# Patient Record
Sex: FEMALE | Race: ASIAN | NOT HISPANIC OR LATINO | Employment: FULL TIME | URBAN - METROPOLITAN AREA
[De-identification: names, ages, dates, MRNs, and addresses within clinical notes are randomized per-mention and may not be internally consistent; named-entity substitution may affect disease eponyms.]

---

## 2019-04-25 ENCOUNTER — OFFICE VISIT (OUTPATIENT)
Dept: MEDICAL GROUP | Facility: MEDICAL CENTER | Age: 29
End: 2019-04-25
Payer: COMMERCIAL

## 2019-04-25 VITALS
HEIGHT: 60 IN | DIASTOLIC BLOOD PRESSURE: 72 MMHG | TEMPERATURE: 97.9 F | RESPIRATION RATE: 18 BRPM | WEIGHT: 110.01 LBS | SYSTOLIC BLOOD PRESSURE: 110 MMHG | BODY MASS INDEX: 21.6 KG/M2 | OXYGEN SATURATION: 97 % | HEART RATE: 76 BPM

## 2019-04-25 DIAGNOSIS — Z00.00 ENCOUNTER FOR PREVENTATIVE ADULT HEALTH CARE EXAMINATION: ICD-10-CM

## 2019-04-25 DIAGNOSIS — Z00.00 PREVENTATIVE HEALTH CARE: ICD-10-CM

## 2019-04-25 PROCEDURE — 99385 PREV VISIT NEW AGE 18-39: CPT | Performed by: PHYSICIAN ASSISTANT

## 2019-04-25 ASSESSMENT — PATIENT HEALTH QUESTIONNAIRE - PHQ9: CLINICAL INTERPRETATION OF PHQ2 SCORE: 0

## 2019-04-25 NOTE — PROGRESS NOTES
Chief Complaint   Patient presents with   • Establish Care   • Orders Needed       Renee Gonzalez is a 29 y.o. new female here for  Establishing care.    HPI:    Patient is healthy without any questions or concerns.  Currently not taking any medicines or supplements.  She is , periods are regular, q 21 day, not currently taking OCP. Has tried it in the past but didn't like them, because she forgets to take it on time, mood swing and felt restless.  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  Positive second hand smoke exposure for the past 2 years.      Current medicines (including changes today)  No current outpatient prescriptions on file.     No current facility-administered medications for this visit.      She  has no past medical history on file.  She  has no past surgical history on file.  Social History   Substance Use Topics   • Smoking status: Never Smoker   • Smokeless tobacco: Never Used   • Alcohol use No     Social History     Social History Narrative   • No narrative on file     History reviewed. No pertinent family history.  Family Status   Relation Status   • Mo Alive   • Fa Alive   • Sis Alive   • Bro Alive       Past medical, surgical, family, and social history is reviewed in Epic chart by me today.   Medications and allergies reviewed in Epic chart by me today.       ROS  General:  No fevers or chills, no night sweats or fatigue.   Eyes: no change in vision.   ENT:         Ears: No drainage. No change in hearing      Nose :No epitaxis        Mouth/ throat: No lesions. No sore throat  Resp: No difficulty breathing. No cough, wheezing.  CV: no SOB, no palpitation, no chest pain, no edema  GI: no nausea, no vomiting, no diarrhea or constipations. Bowel regular and normal. No melena or hematochezia. No hematemesis  : no Frequency, no urgency, no dysuria, no hematuria.   MS:  Negative for muscle pain or weakness.  Skin: no rashes or abnormal lesions.   Neuro: No seizures, no tingling or  "numbness.   Endo: no polyuria, no polydypsia, no cold intolerance, no heat intolerance  Psych: no depression, no anxiety, no Drug/Alcohol abuse  Hem: no easy bruising.    As documented in history of present illness               Objective:     /72 (Patient Position: Sitting)   Pulse 76   Temp 36.6 °C (97.9 °F) (Temporal)   Resp 18   Ht 1.52 m (4' 11.84\")   Wt 49.9 kg (110 lb 0.2 oz)   SpO2 97%  Body mass index is 21.6 kg/m².  Physical Exam:    Constitutional: Well-developed and well-nourished. No distress.   Skin: Skin is warm and dry. No rashes in visible areas.  Nail: w/o pitting, ridging or onychomycosis   Head: Atraumatic without lesions.  Eyes: Equal, round and reactive, conjunctiva clear,sclera non icteric, lids normal.  Ears:  External ears unremarkable. Tympanic membranes clear and intact.  Nose: Nares patent. Septum midline. Turbinates without erythema nor edema. No discharge.    Mouth/Throat: Dentition is good. Tongue normal. Oropharynx is clear and moist. Posterior pharynx without erythema or exudates.  Neck: Supple, trachea midline.  No thyromegaly present. No lymphadenopathy--cervical or supraclavicular  Cardiovascular: Regular rate and rhythm, without any murmurs.  No lower extremity edema. Cap refill < 3sec  Lung:  Clear to auscultation throughout w/o any wheeze or rhonchi. No adventitious sounds.    Abdomen: Soft, non tender, and without distention.   Musculoskeletal: All major joints without pain or swelling  Neurological: speech normal, mental status intact, gait grossly normal  Psychiatric:   Alert and oriented x3, normal affect and mood and behavior     Assessment and Plan:   The following treatment plan was discussed    1. Encounter for preventative adult health care examination      2. Preventative health care    - CBC WITH DIFFERENTIAL; Future  - Comp Metabolic Panel; Future  - Lipid Profile; Future  - TSH WITH REFLEX TO FT4; Future  - VITAMIN D,25 HYDROXY; Future      Last pap < 3 " yrs ago    Followup: Return if symptoms worsen or fail to improve.         Please note that this dictation was created using voice recognition software. I have made every reasonable attempt to correct obvious errors, but I expect that there are errors of grammar and possibly content that I did not discover before finalizing the note

## 2019-05-04 ENCOUNTER — HOSPITAL ENCOUNTER (OUTPATIENT)
Dept: LAB | Facility: MEDICAL CENTER | Age: 29
End: 2019-05-04
Attending: PHYSICIAN ASSISTANT
Payer: COMMERCIAL

## 2019-05-04 DIAGNOSIS — Z00.00 PREVENTATIVE HEALTH CARE: ICD-10-CM

## 2019-05-04 LAB
25(OH)D3 SERPL-MCNC: 15 NG/ML (ref 30–100)
ALBUMIN SERPL BCP-MCNC: 4.1 G/DL (ref 3.2–4.9)
ALBUMIN/GLOB SERPL: 1.3 G/DL
ALP SERPL-CCNC: 41 U/L (ref 30–99)
ALT SERPL-CCNC: 15 U/L (ref 2–50)
ANION GAP SERPL CALC-SCNC: 4 MMOL/L (ref 0–11.9)
AST SERPL-CCNC: 17 U/L (ref 12–45)
BASOPHILS # BLD AUTO: 1.3 % (ref 0–1.8)
BASOPHILS # BLD: 0.07 K/UL (ref 0–0.12)
BILIRUB SERPL-MCNC: 0.5 MG/DL (ref 0.1–1.5)
BUN SERPL-MCNC: 10 MG/DL (ref 8–22)
CALCIUM SERPL-MCNC: 9.1 MG/DL (ref 8.5–10.5)
CHLORIDE SERPL-SCNC: 105 MMOL/L (ref 96–112)
CHOLEST SERPL-MCNC: 184 MG/DL (ref 100–199)
CO2 SERPL-SCNC: 26 MMOL/L (ref 20–33)
CREAT SERPL-MCNC: 0.69 MG/DL (ref 0.5–1.4)
EOSINOPHIL # BLD AUTO: 0.17 K/UL (ref 0–0.51)
EOSINOPHIL NFR BLD: 3.1 % (ref 0–6.9)
ERYTHROCYTE [DISTWIDTH] IN BLOOD BY AUTOMATED COUNT: 45.1 FL (ref 35.9–50)
FASTING STATUS PATIENT QL REPORTED: NORMAL
GLOBULIN SER CALC-MCNC: 3.1 G/DL (ref 1.9–3.5)
GLUCOSE SERPL-MCNC: 93 MG/DL (ref 65–99)
HCT VFR BLD AUTO: 44.5 % (ref 37–47)
HDLC SERPL-MCNC: 74 MG/DL
HGB BLD-MCNC: 14.2 G/DL (ref 12–16)
IMM GRANULOCYTES # BLD AUTO: 0.01 K/UL (ref 0–0.11)
IMM GRANULOCYTES NFR BLD AUTO: 0.2 % (ref 0–0.9)
LDLC SERPL CALC-MCNC: 96 MG/DL
LYMPHOCYTES # BLD AUTO: 2.31 K/UL (ref 1–4.8)
LYMPHOCYTES NFR BLD: 41.6 % (ref 22–41)
MCH RBC QN AUTO: 30 PG (ref 27–33)
MCHC RBC AUTO-ENTMCNC: 31.9 G/DL (ref 33.6–35)
MCV RBC AUTO: 93.9 FL (ref 81.4–97.8)
MONOCYTES # BLD AUTO: 0.36 K/UL (ref 0–0.85)
MONOCYTES NFR BLD AUTO: 6.5 % (ref 0–13.4)
NEUTROPHILS # BLD AUTO: 2.63 K/UL (ref 2–7.15)
NEUTROPHILS NFR BLD: 47.3 % (ref 44–72)
NRBC # BLD AUTO: 0 K/UL
NRBC BLD-RTO: 0 /100 WBC
PLATELET # BLD AUTO: 339 K/UL (ref 164–446)
PMV BLD AUTO: 9.4 FL (ref 9–12.9)
POTASSIUM SERPL-SCNC: 3.7 MMOL/L (ref 3.6–5.5)
PROT SERPL-MCNC: 7.2 G/DL (ref 6–8.2)
RBC # BLD AUTO: 4.74 M/UL (ref 4.2–5.4)
SODIUM SERPL-SCNC: 135 MMOL/L (ref 135–145)
TRIGL SERPL-MCNC: 72 MG/DL (ref 0–149)
TSH SERPL DL<=0.005 MIU/L-ACNC: 2.76 UIU/ML (ref 0.38–5.33)
WBC # BLD AUTO: 5.6 K/UL (ref 4.8–10.8)

## 2019-05-04 PROCEDURE — 85025 COMPLETE CBC W/AUTO DIFF WBC: CPT

## 2019-05-04 PROCEDURE — 36415 COLL VENOUS BLD VENIPUNCTURE: CPT

## 2019-05-04 PROCEDURE — 84443 ASSAY THYROID STIM HORMONE: CPT

## 2019-05-04 PROCEDURE — 80061 LIPID PANEL: CPT

## 2019-05-04 PROCEDURE — 82306 VITAMIN D 25 HYDROXY: CPT

## 2019-05-04 PROCEDURE — 80053 COMPREHEN METABOLIC PANEL: CPT

## 2019-05-07 ENCOUNTER — TELEPHONE (OUTPATIENT)
Dept: MEDICAL GROUP | Facility: MEDICAL CENTER | Age: 29
End: 2019-05-07

## 2019-05-07 NOTE — TELEPHONE ENCOUNTER
----- Message from Eileen Lockhart P.A.-C. sent at 5/7/2019  3:03 PM PDT -----  Please inform patient that     All labs look stable without any significant abnormal findings.   Vitamin D level is low. You can start over-the-counter vitamin D 1000 international units daily.    Eileen Lockhart P.A.-C.

## 2019-05-15 ENCOUNTER — APPOINTMENT (OUTPATIENT)
Dept: MEDICAL GROUP | Facility: MEDICAL CENTER | Age: 29
End: 2019-05-15
Payer: COMMERCIAL

## 2019-10-23 ENCOUNTER — HOSPITAL ENCOUNTER (OUTPATIENT)
Facility: MEDICAL CENTER | Age: 29
End: 2019-10-23
Attending: PHYSICIAN ASSISTANT
Payer: COMMERCIAL

## 2019-10-23 ENCOUNTER — OFFICE VISIT (OUTPATIENT)
Dept: MEDICAL GROUP | Facility: MEDICAL CENTER | Age: 29
End: 2019-10-23
Payer: COMMERCIAL

## 2019-10-23 VITALS
WEIGHT: 117 LBS | TEMPERATURE: 97.9 F | HEART RATE: 84 BPM | RESPIRATION RATE: 18 BRPM | HEIGHT: 60 IN | OXYGEN SATURATION: 98 % | BODY MASS INDEX: 22.97 KG/M2 | DIASTOLIC BLOOD PRESSURE: 78 MMHG | SYSTOLIC BLOOD PRESSURE: 110 MMHG

## 2019-10-23 DIAGNOSIS — Z01.419 ROUTINE GYNECOLOGICAL EXAMINATION: ICD-10-CM

## 2019-10-23 DIAGNOSIS — Z23 NEED FOR VACCINATION: ICD-10-CM

## 2019-10-23 DIAGNOSIS — Z12.4 SCREENING FOR MALIGNANT NEOPLASM OF CERVIX: ICD-10-CM

## 2019-10-23 PROCEDURE — 87624 HPV HI-RISK TYP POOLED RSLT: CPT

## 2019-10-23 PROCEDURE — 90686 IIV4 VACC NO PRSV 0.5 ML IM: CPT | Performed by: PHYSICIAN ASSISTANT

## 2019-10-23 PROCEDURE — 88175 CYTOPATH C/V AUTO FLUID REDO: CPT

## 2019-10-23 PROCEDURE — 90471 IMMUNIZATION ADMIN: CPT | Performed by: PHYSICIAN ASSISTANT

## 2019-10-23 PROCEDURE — 99395 PREV VISIT EST AGE 18-39: CPT | Mod: 25 | Performed by: PHYSICIAN ASSISTANT

## 2019-10-23 NOTE — PROGRESS NOTES
SUBJECTIVE:   Chief Complaint   Patient presents with   • Gynecologic Exam     pap       29 y.o. female for annual routine gynecologic exam. Generally the patient is feeling good. She has no complaints or concerns.   Regarding her health maintenance:       OB History    Para Term  AB Living   0 0 0 0 0 0   SAB TAB Ectopic Molar Multiple Live Births   0 0 0 0 0 0      Social History     Substance and Sexual Activity   Sexual Activity Yes   • Partners: Male       Last Pap: years ago in [facility]   H/O Abnormal Pap no  H/O STD: no  Menses every month with 4-5 days moderate bleeding.  No significant bloating/fluid retention, pelvic pain, or dyspareunia. No vaginal discharge  She does perform regular self breast exams.  No breast tenderness, mass, nipple discharge, changes in size or contour, or abnormal cyclic discomfort.          ROS:    NO SOB, CP, Palpitations, hest pain on exertion.  No urinary tract symptoms, no incontinence, hematuria.   No dyspareunia, no vaginal discharge   No abdominal pain, change in bowel habits, black or bloody stools.    No unusual headaches, no visual changes, menstrual migraines   No depression, or anxiety, libido changes,   No rash,  pigment changes.       Social History     Tobacco Use   • Smoking status: Never Smoker   • Smokeless tobacco: Never Used   Substance Use Topics   • Alcohol use: No   • Drug use: No       There are no active problems to display for this patient.      History reviewed. No pertinent past medical history.  History reviewed. No pertinent surgical history.      History reviewed. No pertinent family history.  Family Status   Relation Name Status   • Mo  Alive   • Fa  Alive   • Sis  Alive   • Bro  Alive         Current medicines (including changes today)  No current outpatient medications on file.     No current facility-administered medications for this visit.            Allergies: Patient has no allergy information on record.     Preventive  "Care:  Health Maintenance Topics with due status: Overdue       Topic Date Due    PAP SMEAR 03/29/2010    IMM INFLUENZA 09/01/2019       OBJECTIVE:   /78 (BP Location: Right arm, Patient Position: Sitting, BP Cuff Size: Adult)   Pulse 84   Temp 36.6 °C (97.9 °F) (Temporal)   Resp 18   Ht 1.52 m (4' 11.84\")   Wt 53.1 kg (117 lb)   LMP 09/29/2019   SpO2 98%   Breastfeeding? No   BMI 22.97 kg/m²   Body mass index is 22.97 kg/m².      Gen: Well developed, well nourished in no acute distress.   Skin: Pink, warm, and dry  Abdomen: soft, nontender,  No organomegaly,  Ext: no edema, color normal, vascularity normal, temperature normal  Alert and oriented Eye contact is good, speech goal directed, affect calm     Breast Exam: Performed with instruction during examination. No axillary lymphadenopathy, no skin changes, no dominant masses. No nipple retraction, no nipple discharge    Pelvic Exam:  Normal external genitalia with no lesions. Normal vaginal mucosa with normal rugation and no discharge. Cervix with no visible lesions. No cervical motion tenderness. Uterus is normal sized with no masses. No adnexal tenderness or enlargement appreciated. Pap is obtained and the specimen was sent to lab,        <ASSESSMENT and PLAN>  1. Need for vaccination  Influenza Vaccine Quad Injection (PF)   2. Screening for malignant neoplasm of cervix  THINPREP PAP WITH HPV   3. Routine gynecological examination             Follow-up prn                    "

## 2019-10-24 DIAGNOSIS — Z12.4 SCREENING FOR MALIGNANT NEOPLASM OF CERVIX: ICD-10-CM

## 2019-10-24 LAB
CYTOLOGY REG CYTOL: NORMAL
HPV HR 12 DNA CVX QL NAA+PROBE: NEGATIVE
HPV16 DNA SPEC QL NAA+PROBE: NEGATIVE
HPV18 DNA SPEC QL NAA+PROBE: NEGATIVE
SPECIMEN SOURCE: NORMAL

## 2019-10-24 PROCEDURE — 88175 CYTOPATH C/V AUTO FLUID REDO: CPT

## 2019-10-29 ENCOUNTER — TELEPHONE (OUTPATIENT)
Dept: MEDICAL GROUP | Facility: MEDICAL CENTER | Age: 29
End: 2019-10-29

## 2019-10-29 NOTE — TELEPHONE ENCOUNTER
----- Message from Eileen Lockhart P.A.-C. sent at 10/29/2019 12:06 PM PDT -----  Jamil Duran,    The results of your most recent Pap smear are normal with neg HPV which is a virus associated with cervical cancer.    Thank you,  Eileen Lockhart P.A.-C.

## 2019-10-29 NOTE — TELEPHONE ENCOUNTER
Phone Number Called: [phone] (home)     Call outcome: left message for patient to call back regarding message below    Message: LVM informing pt. We have her results and to give us a call back.

## 2020-01-23 ENCOUNTER — OFFICE VISIT (OUTPATIENT)
Dept: MEDICAL GROUP | Facility: MEDICAL CENTER | Age: 30
End: 2020-01-23
Payer: COMMERCIAL

## 2020-01-23 VITALS
DIASTOLIC BLOOD PRESSURE: 62 MMHG | HEIGHT: 60 IN | HEART RATE: 92 BPM | BODY MASS INDEX: 22.26 KG/M2 | OXYGEN SATURATION: 96 % | WEIGHT: 113.4 LBS | TEMPERATURE: 98.8 F | RESPIRATION RATE: 16 BRPM | SYSTOLIC BLOOD PRESSURE: 100 MMHG

## 2020-01-23 DIAGNOSIS — J02.9 SORE THROAT: ICD-10-CM

## 2020-01-23 DIAGNOSIS — J02.0 STREP THROAT: ICD-10-CM

## 2020-01-23 LAB
INT CON NEG: NEGATIVE
INT CON POS: POSITIVE
S PYO AG THROAT QL: POSITIVE

## 2020-01-23 PROCEDURE — 87880 STREP A ASSAY W/OPTIC: CPT | Performed by: PHYSICIAN ASSISTANT

## 2020-01-23 PROCEDURE — 99214 OFFICE O/P EST MOD 30 MIN: CPT | Performed by: PHYSICIAN ASSISTANT

## 2020-01-23 RX ORDER — AMOXICILLIN 500 MG/1
500 CAPSULE ORAL 3 TIMES DAILY
Qty: 30 CAP | Refills: 0 | Status: SHIPPED | OUTPATIENT
Start: 2020-01-23 | End: 2020-02-02

## 2020-01-23 ASSESSMENT — PATIENT HEALTH QUESTIONNAIRE - PHQ9: CLINICAL INTERPRETATION OF PHQ2 SCORE: 0

## 2020-01-23 NOTE — PROGRESS NOTES
"Subjective:      Renee Gonzalez is a 29 y.o. female who presents with Pharyngitis (x 2 days)        sore throat X 2 days    HPIPatient presents with sore throat, fatigue, body aches started 2 days ago. No recent travel. People at work are sick. No cough. No congestion. otc med not helping. Getting worse. No difficulty swallowing or speaking. No neck pain or stiffness.    ROSno known fever. Some chills and body aches. No headache/dizziness. No chest pain, SOB, difficulty breathing. No cough/chest congestion. No n/v/d/c or abdominal pain. No rash or skin lesion. No joint redness or swelling. No urinary symptoms.    PMHX: negative for heart or lung disease. Negative for diabetes or immune disorder  Meds: on no regular daily meds  nkda  Non-smoker, works in [employer]   Objective:     /62 (BP Location: Left arm, Patient Position: Sitting, BP Cuff Size: Adult long)   Pulse 92   Temp 37.1 °C (98.8 °F) (Temporal)   Resp 16   Ht 1.52 m (4' 11.84\")   Wt 51.4 kg (113 lb 6.4 oz)   SpO2 96%   BMI 22.27 kg/m²      Physical Exam  Vitals signs and nursing note reviewed.   Constitutional:       General: She is not in acute distress.     Appearance: Normal appearance. She is normal weight. She is not ill-appearing, toxic-appearing or diaphoretic.   HENT:      Head: Normocephalic and atraumatic.      Right Ear: Tympanic membrane, ear canal and external ear normal.      Left Ear: Tympanic membrane, ear canal and external ear normal.      Nose: Nose normal.      Mouth/Throat:      Mouth: Mucous membranes are moist.      Pharynx: Oropharyngeal exudate and posterior oropharyngeal erythema present.      Comments: bilat enlarged 2+ tonsils  Eyes:      Conjunctiva/sclera: Conjunctivae normal.   Neck:      Musculoskeletal: Normal range of motion and neck supple. No neck rigidity or muscular tenderness.   Cardiovascular:      Rate and Rhythm: Normal rate and regular rhythm.   Pulmonary:      Effort: Pulmonary effort is normal.      " Breath sounds: Normal breath sounds.   Lymphadenopathy:      Cervical: No cervical adenopathy.   Skin:     General: Skin is warm and dry.      Coloration: Skin is not pale.      Findings: No rash.   Neurological:      General: No focal deficit present.      Mental Status: She is alert and oriented to person, place, and time.   Psychiatric:         Mood and Affect: Mood normal.         Behavior: Behavior normal.         Thought Content: Thought content normal.         Judgment: Judgment normal.               POCT strep positive  Assessment/Plan:     1. Strep throat    - amoxicillin (AMOXIL) 500 MG Cap; Take 1 Cap by mouth 3 times a day for 10 days.  Dispense: 30 Cap; Refill: 0    2. Sore throat - supportive care and otc meds discussed. Stay home from work today. Ok to return tomorrow if no fever.     - POCT Rapid Strep A  - amoxicillin (AMOXIL) 500 MG Cap; Take 1 Cap by mouth 3 times a day for 10 days.  Dispense: 30 Cap; Refill: 0    F/u prn

## 2020-11-24 ENCOUNTER — NON-PROVIDER VISIT (OUTPATIENT)
Dept: MEDICAL GROUP | Facility: MEDICAL CENTER | Age: 30
End: 2020-11-24
Payer: COMMERCIAL

## 2020-11-24 DIAGNOSIS — Z23 NEED FOR VACCINATION: ICD-10-CM

## 2020-11-24 PROCEDURE — 99999 PR NO CHARGE: CPT | Performed by: PHYSICIAN ASSISTANT

## 2020-11-24 PROCEDURE — 90471 IMMUNIZATION ADMIN: CPT | Performed by: PHYSICIAN ASSISTANT

## 2020-11-24 PROCEDURE — 90686 IIV4 VACC NO PRSV 0.5 ML IM: CPT | Performed by: PHYSICIAN ASSISTANT

## 2020-11-24 NOTE — PROGRESS NOTES
"Renee Gonzalez is a 30 y.o. female here for a non-provider visit for:   FLU    Reason for immunization: Annual Flu Vaccine  Immunization records indicate need for vaccine: Yes, confirmed with Epic  Minimum interval has been met for this vaccine: Yes  ABN completed: Not Indicated    Order and dose verified by: JOHNNY  VIS Dated  08/19/2019 was given to patient: Yes  All IAC Questionnaire questions were answered \"No.\"    Patient tolerated injection and no adverse effects were observed or reported: Yes    Pt scheduled for next dose in series: Not Indicated    "

## 2021-01-12 DIAGNOSIS — Z00.00 PREVENTATIVE HEALTH CARE: ICD-10-CM

## 2021-01-13 ENCOUNTER — HOSPITAL ENCOUNTER (OUTPATIENT)
Dept: LAB | Facility: MEDICAL CENTER | Age: 31
End: 2021-01-13
Attending: PHYSICIAN ASSISTANT
Payer: COMMERCIAL

## 2021-01-13 DIAGNOSIS — Z00.00 PREVENTATIVE HEALTH CARE: ICD-10-CM

## 2021-01-13 LAB
25(OH)D3 SERPL-MCNC: 18 NG/ML (ref 30–100)
ALBUMIN SERPL BCP-MCNC: 4.6 G/DL (ref 3.2–4.9)
ALBUMIN/GLOB SERPL: 1.4 G/DL
ALP SERPL-CCNC: 54 U/L (ref 30–99)
ALT SERPL-CCNC: 9 U/L (ref 2–50)
ANION GAP SERPL CALC-SCNC: 9 MMOL/L (ref 7–16)
AST SERPL-CCNC: 17 U/L (ref 12–45)
BASOPHILS # BLD AUTO: 1.2 % (ref 0–1.8)
BASOPHILS # BLD: 0.07 K/UL (ref 0–0.12)
BILIRUB SERPL-MCNC: 0.2 MG/DL (ref 0.1–1.5)
BUN SERPL-MCNC: 16 MG/DL (ref 8–22)
CALCIUM SERPL-MCNC: 9.1 MG/DL (ref 8.5–10.5)
CHLORIDE SERPL-SCNC: 104 MMOL/L (ref 96–112)
CHOLEST SERPL-MCNC: 186 MG/DL (ref 100–199)
CO2 SERPL-SCNC: 23 MMOL/L (ref 20–33)
CREAT SERPL-MCNC: 0.65 MG/DL (ref 0.5–1.4)
EOSINOPHIL # BLD AUTO: 0.07 K/UL (ref 0–0.51)
EOSINOPHIL NFR BLD: 1.2 % (ref 0–6.9)
ERYTHROCYTE [DISTWIDTH] IN BLOOD BY AUTOMATED COUNT: 44.1 FL (ref 35.9–50)
GLOBULIN SER CALC-MCNC: 3.2 G/DL (ref 1.9–3.5)
GLUCOSE SERPL-MCNC: 98 MG/DL (ref 65–99)
HCT VFR BLD AUTO: 43 % (ref 37–47)
HDLC SERPL-MCNC: 66 MG/DL
HGB BLD-MCNC: 14 G/DL (ref 12–16)
IMM GRANULOCYTES # BLD AUTO: 0.01 K/UL (ref 0–0.11)
IMM GRANULOCYTES NFR BLD AUTO: 0.2 % (ref 0–0.9)
LDLC SERPL CALC-MCNC: 107 MG/DL
LYMPHOCYTES # BLD AUTO: 2.11 K/UL (ref 1–4.8)
LYMPHOCYTES NFR BLD: 37.1 % (ref 22–41)
MCH RBC QN AUTO: 29.9 PG (ref 27–33)
MCHC RBC AUTO-ENTMCNC: 32.6 G/DL (ref 33.6–35)
MCV RBC AUTO: 91.9 FL (ref 81.4–97.8)
MONOCYTES # BLD AUTO: 0.31 K/UL (ref 0–0.85)
MONOCYTES NFR BLD AUTO: 5.5 % (ref 0–13.4)
NEUTROPHILS # BLD AUTO: 3.11 K/UL (ref 2–7.15)
NEUTROPHILS NFR BLD: 54.8 % (ref 44–72)
NRBC # BLD AUTO: 0 K/UL
NRBC BLD-RTO: 0 /100 WBC
PLATELET # BLD AUTO: 368 K/UL (ref 164–446)
PMV BLD AUTO: 9.1 FL (ref 9–12.9)
POTASSIUM SERPL-SCNC: 4.3 MMOL/L (ref 3.6–5.5)
PROT SERPL-MCNC: 7.8 G/DL (ref 6–8.2)
RBC # BLD AUTO: 4.68 M/UL (ref 4.2–5.4)
SODIUM SERPL-SCNC: 136 MMOL/L (ref 135–145)
TRIGL SERPL-MCNC: 65 MG/DL (ref 0–149)
TSH SERPL DL<=0.005 MIU/L-ACNC: 3.01 UIU/ML (ref 0.38–5.33)
WBC # BLD AUTO: 5.7 K/UL (ref 4.8–10.8)

## 2021-01-13 PROCEDURE — 80053 COMPREHEN METABOLIC PANEL: CPT

## 2021-01-13 PROCEDURE — 82306 VITAMIN D 25 HYDROXY: CPT

## 2021-01-13 PROCEDURE — 36415 COLL VENOUS BLD VENIPUNCTURE: CPT

## 2021-01-13 PROCEDURE — 80061 LIPID PANEL: CPT

## 2021-01-13 PROCEDURE — 84443 ASSAY THYROID STIM HORMONE: CPT

## 2021-01-13 PROCEDURE — 85025 COMPLETE CBC W/AUTO DIFF WBC: CPT

## 2021-01-14 ENCOUNTER — TELEPHONE (OUTPATIENT)
Dept: MEDICAL GROUP | Facility: MEDICAL CENTER | Age: 31
End: 2021-01-14

## 2021-01-14 NOTE — TELEPHONE ENCOUNTER
Phone Number Called: [phone] (home)     Call outcome: Did not leave a detailed message. Requested patient to call back.    Message: LVM for pt to give us a call back regarding her lab results.

## 2021-01-15 ENCOUNTER — OFFICE VISIT (OUTPATIENT)
Dept: MEDICAL GROUP | Facility: MEDICAL CENTER | Age: 31
End: 2021-01-15
Payer: COMMERCIAL

## 2021-01-15 VITALS
HEIGHT: 60 IN | OXYGEN SATURATION: 97 % | SYSTOLIC BLOOD PRESSURE: 112 MMHG | BODY MASS INDEX: 22.58 KG/M2 | WEIGHT: 115 LBS | RESPIRATION RATE: 12 BRPM | DIASTOLIC BLOOD PRESSURE: 74 MMHG | TEMPERATURE: 98.9 F | HEART RATE: 80 BPM

## 2021-01-15 DIAGNOSIS — Z00.00 ANNUAL PHYSICAL EXAM: ICD-10-CM

## 2021-01-15 PROCEDURE — 99395 PREV VISIT EST AGE 18-39: CPT | Performed by: PHYSICIAN ASSISTANT

## 2021-01-15 ASSESSMENT — PATIENT HEALTH QUESTIONNAIRE - PHQ9: CLINICAL INTERPRETATION OF PHQ2 SCORE: 0

## 2021-01-15 ASSESSMENT — FIBROSIS 4 INDEX: FIB4 SCORE: 0.48

## 2021-01-15 NOTE — PROGRESS NOTES
Subjective:   CC:   Chief Complaint   Patient presents with   • Annual Exam       Renee Gonzalez is a 30 y.o. female here today for annual exam.  Patient is feeling good without any questions concerns or complaints.  Weight has been stable.  All labs were reviewed by me and discussed with patient.  Vitamin D level is low and patient is currently only taking a multivitamin  Last Pap 1 and half years ago, normal        Current medicines (including changes today)  No current outpatient medications on file.     No current facility-administered medications for this visit.          Past medical, surgical, family, and social history are reviewed in Epic chart by me today.   Medications and allergies reviewed in Epic chart by me today.         ROS   No chest pain, no shortness of breath, no abdominal pain  As documented in history of present illness above     Objective:     /74 (BP Location: Right arm, Patient Position: Sitting, BP Cuff Size: Adult)   Pulse 80   Temp 37.2 °C (98.9 °F) (Temporal)   Resp 12   Ht 1.524 m (5')   Wt 52.2 kg (115 lb)   SpO2 97%  Body mass index is 22.46 kg/m².   Physical Exam:  Constitutional: Alert, oriented in no acute distress.  Psych: Eye contact is good, speech goal directed, affect calm  Eyes: Conjunctiva non-injected, sclera non-icteric.  ENMT: Ears:Pinna normal. TM pearly gray.              Neck: No cervical or supraclavicular lymphadenopathy,Trachea midline, no thyromegaly, no masses  Lungs: Unlabored respiratory effort, clear to auscultation bilaterally with good excursion, no wheez or rhonci  CV: regular rate and rhythm. No lower extremity edema  Abdomen: soft, nontender, No CVAT  Skin: no lesions in visible areas.  Ext: no edema, color normal, vascularity normal, temperature normal        Assessment and Plan:   The following treatment plan was discussed    1. Annual physical exam        Followup: prn          Please note that this dictation was created using voice  recognition software. I have made every reasonable attempt to correct obvious errors, but I expect that there are errors of grammar and possibly content that I did not discover before finalizing the note.

## 2021-04-27 ENCOUNTER — IMMUNIZATION (OUTPATIENT)
Dept: FAMILY PLANNING/WOMEN'S HEALTH CLINIC | Facility: IMMUNIZATION CENTER | Age: 31
End: 2021-04-27
Payer: COMMERCIAL

## 2021-04-27 DIAGNOSIS — Z23 ENCOUNTER FOR VACCINATION: Primary | ICD-10-CM

## 2021-04-27 PROCEDURE — 0001A PFIZER SARS-COV-2 VACCINE: CPT

## 2021-04-27 PROCEDURE — 91300 PFIZER SARS-COV-2 VACCINE: CPT

## 2021-05-01 ENCOUNTER — PATIENT OUTREACH (OUTPATIENT)
Dept: SCHEDULING | Facility: IMAGING CENTER | Age: 31
End: 2021-05-01

## 2021-05-20 ENCOUNTER — IMMUNIZATION (OUTPATIENT)
Dept: FAMILY PLANNING/WOMEN'S HEALTH CLINIC | Facility: IMMUNIZATION CENTER | Age: 31
End: 2021-05-20
Payer: COMMERCIAL

## 2021-05-20 DIAGNOSIS — Z23 ENCOUNTER FOR VACCINATION: Primary | ICD-10-CM

## 2021-05-20 PROCEDURE — 0002A PFIZER SARS-COV-2 VACCINE: CPT

## 2021-05-20 PROCEDURE — 91300 PFIZER SARS-COV-2 VACCINE: CPT

## 2021-09-21 ENCOUNTER — OFFICE VISIT (OUTPATIENT)
Dept: MEDICAL GROUP | Facility: MEDICAL CENTER | Age: 31
End: 2021-09-21
Payer: COMMERCIAL

## 2021-09-21 VITALS
SYSTOLIC BLOOD PRESSURE: 108 MMHG | TEMPERATURE: 98.1 F | HEART RATE: 76 BPM | WEIGHT: 108.91 LBS | DIASTOLIC BLOOD PRESSURE: 78 MMHG | HEIGHT: 60 IN | RESPIRATION RATE: 16 BRPM | OXYGEN SATURATION: 98 % | BODY MASS INDEX: 21.38 KG/M2

## 2021-09-21 DIAGNOSIS — N94.6 MENSTRUAL PAIN: ICD-10-CM

## 2021-09-21 PROCEDURE — 99213 OFFICE O/P EST LOW 20 MIN: CPT | Performed by: PHYSICIAN ASSISTANT

## 2021-09-21 ASSESSMENT — FIBROSIS 4 INDEX: FIB4 SCORE: 0.49

## 2021-09-21 NOTE — PROGRESS NOTES
Chief Complaint   Patient presents with   • Abdominal Pain     during menstrual        HPI  Renee Gonzalez is a 31 y.o. female here today for abdominal pain with her menses.     HPI:  Patient states she usually gets painful periods however her last menses was excruciating and very painful which concerned her.  Pain was over lower abdomen LMP Aug 30- Sep 2nd, flow without changes, pain has resolved after her period was over.  Patient is currently not taking any birth control, wants to start a family in the next year or 2        Exam:  /78 (BP Location: Left arm, Patient Position: Sitting)   Pulse 76   Temp 36.7 °C (98.1 °F) (Temporal)   Resp 16   Ht 1.524 m (5')   Wt 49.4 kg (108 lb 14.5 oz)   SpO2 98%       Constitutional: Alert, oriented in no acute distress.  Psych: Eye contact is good, speech goal directed, affect calm  Abdomen: soft, nontender, No CVAT    A/P:  1. Menstrual pain  Advised patient to follow-up if this happens again however at this point not very concerning as the pain was only during her menses  - US-PELVIC COMPLETE (TRANSABDOMINAL/TRANSVAGINAL) (COMBO); Future        F/U: prn    Kidney transplant recipient

## 2021-10-04 ENCOUNTER — HOSPITAL ENCOUNTER (OUTPATIENT)
Dept: RADIOLOGY | Facility: MEDICAL CENTER | Age: 31
End: 2021-10-04
Attending: PHYSICIAN ASSISTANT
Payer: COMMERCIAL

## 2021-10-04 DIAGNOSIS — N94.6 MENSTRUAL PAIN: ICD-10-CM

## 2021-10-04 PROCEDURE — 76830 TRANSVAGINAL US NON-OB: CPT

## 2021-10-06 ENCOUNTER — TELEPHONE (OUTPATIENT)
Dept: MEDICAL GROUP | Facility: MEDICAL CENTER | Age: 31
End: 2021-10-06

## 2021-10-06 DIAGNOSIS — R10.2 PELVIC PAIN IN FEMALE: ICD-10-CM

## 2021-10-06 DIAGNOSIS — N80.9 ENDOMETRIOSIS: ICD-10-CM

## 2021-10-06 NOTE — TELEPHONE ENCOUNTER
[facility] for pt to call back for results        ----- Message from Latonia Parada M.D. sent at 10/6/2021  4:19 PM PDT -----  Her US shows possible endometrosis which is a common cause of menstrual pain. The radiologist recommended a follow up MRI for more information.  I would like her to do this and schedule follow up with Eileen or gyn to discuss

## 2021-10-07 ENCOUNTER — TELEPHONE (OUTPATIENT)
Dept: MEDICAL GROUP | Facility: MEDICAL CENTER | Age: 31
End: 2021-10-07

## 2021-10-07 NOTE — TELEPHONE ENCOUNTER
Phone Number Called: [phone] (home)     Call outcome: Did not leave a detailed message. Requested patient to call back.    Message: LVM for patient regarding U/S results, no answer from pt

## 2021-10-26 ENCOUNTER — APPOINTMENT (OUTPATIENT)
Dept: RADIOLOGY | Facility: MEDICAL CENTER | Age: 31
End: 2021-10-26
Attending: FAMILY MEDICINE
Payer: COMMERCIAL

## 2021-10-26 DIAGNOSIS — R10.2 PELVIC PAIN IN FEMALE: ICD-10-CM

## 2021-10-26 PROCEDURE — 72197 MRI PELVIS W/O & W/DYE: CPT

## 2021-10-26 PROCEDURE — 700117 HCHG RX CONTRAST REV CODE 255: Performed by: FAMILY MEDICINE

## 2021-10-26 PROCEDURE — A9576 INJ PROHANCE MULTIPACK: HCPCS | Performed by: FAMILY MEDICINE

## 2021-10-26 RX ADMIN — GADOTERIDOL 15 ML: 279.3 INJECTION, SOLUTION INTRAVENOUS at 14:25

## 2021-12-08 ENCOUNTER — OFFICE VISIT (OUTPATIENT)
Dept: MEDICAL GROUP | Facility: MEDICAL CENTER | Age: 31
End: 2021-12-08
Payer: COMMERCIAL

## 2021-12-08 VITALS
HEART RATE: 92 BPM | RESPIRATION RATE: 16 BRPM | DIASTOLIC BLOOD PRESSURE: 70 MMHG | TEMPERATURE: 97.7 F | OXYGEN SATURATION: 97 % | BODY MASS INDEX: 21.9 KG/M2 | SYSTOLIC BLOOD PRESSURE: 110 MMHG | WEIGHT: 111.55 LBS | HEIGHT: 60 IN

## 2021-12-08 DIAGNOSIS — N80.9 ENDOMETRIOSIS: ICD-10-CM

## 2021-12-08 DIAGNOSIS — Z23 NEED FOR VACCINATION: ICD-10-CM

## 2021-12-08 PROCEDURE — 90471 IMMUNIZATION ADMIN: CPT | Performed by: PHYSICIAN ASSISTANT

## 2021-12-08 PROCEDURE — 90686 IIV4 VACC NO PRSV 0.5 ML IM: CPT | Performed by: PHYSICIAN ASSISTANT

## 2021-12-08 PROCEDURE — 99213 OFFICE O/P EST LOW 20 MIN: CPT | Mod: 25 | Performed by: PHYSICIAN ASSISTANT

## 2021-12-08 RX ORDER — NORETHINDRONE ACETATE AND ETHINYL ESTRADIOL, ETHINYL ESTRADIOL AND FERROUS FUMARATE 1MG-10(24)
1 KIT ORAL DAILY
Qty: 84 TABLET | Refills: 3 | Status: SHIPPED | OUTPATIENT
Start: 2021-12-08

## 2021-12-08 ASSESSMENT — FIBROSIS 4 INDEX: FIB4 SCORE: 0.49

## 2021-12-08 NOTE — PROGRESS NOTES
Chief Complaint   Patient presents with   • Referral Update Request     obgyn        ISHMAEL Duran ROMAN Gonzalez is a 31 y.o. female here today with  for follow-up on cramps and endometriosis.      Transvaginal ultrasound and MRI suggested patient have endometriosis, states she gets pain and cramping with. But also sometimes not on her menses. Patient is not on any B birth control and is hesitant to start OCP since she has tried it once many years ago any affected her mood.  Patient thinks they might want to start a family in next couple years.          Exam:  /70 (BP Location: Left arm, Patient Position: Sitting)   Pulse 92   Temp 36.5 °C (97.7 °F) (Temporal)   Resp 16   Ht 1.524 m (5')   Wt 50.6 kg (111 lb 8.8 oz)   SpO2 97%       Constitutional: Alert, oriented in no acute distress.  Psych: Eye contact is good, speech goal directed, affect calm  Eyes: Conjunctiva non-injected, sclera non-icteric.  Lungs: Unlabored respiratory effort, clear to auscultation bilaterally with good excursion, no wheez or rhonci  CV: regular rate and rhythm. No lower extremity edema        A/P:  1. Need for vaccination    - INFLUENZA VACCINE QUAD INJ (PF)    2. Endometriosis  Patient is hesitant to start OCP, stated she might try it  - Referral to OB/Gyn  - Norethin-Eth Estrad-Fe Biphas (LO LOESTRIN FE) 1 MG-10 MCG / 10 MCG Tab; Take 1 Tablet by mouth every day.  Dispense: 84 Tablet; Refill: 3        F/U: prn

## 2022-06-02 ENCOUNTER — HOSPITAL ENCOUNTER (OUTPATIENT)
Facility: MEDICAL CENTER | Age: 32
End: 2022-06-02
Attending: OBSTETRICS & GYNECOLOGY
Payer: COMMERCIAL

## 2022-06-02 PROCEDURE — 88175 CYTOPATH C/V AUTO FLUID REDO: CPT

## 2022-06-02 PROCEDURE — 87624 HPV HI-RISK TYP POOLED RSLT: CPT

## 2022-07-06 ENCOUNTER — HOSPITAL ENCOUNTER (OUTPATIENT)
Dept: RADIOLOGY | Facility: MEDICAL CENTER | Age: 32
End: 2022-07-06
Attending: OBSTETRICS & GYNECOLOGY
Payer: COMMERCIAL

## 2022-07-06 DIAGNOSIS — N80.109 ENDOMETRIOSIS OF OVARY: ICD-10-CM

## 2022-07-06 PROCEDURE — 76830 TRANSVAGINAL US NON-OB: CPT

## 2022-11-01 ENCOUNTER — TELEPHONE (OUTPATIENT)
Dept: MEDICAL GROUP | Facility: MEDICAL CENTER | Age: 32
End: 2022-11-01
Payer: COMMERCIAL

## 2022-11-01 DIAGNOSIS — Z00.00 PREVENTATIVE HEALTH CARE: ICD-10-CM

## 2022-11-01 NOTE — TELEPHONE ENCOUNTER
PT called stating she has upcoming apt with you on 11/10. Would you like to order labs prior to her apt or during? Please advise.

## 2022-11-02 ENCOUNTER — HOSPITAL ENCOUNTER (OUTPATIENT)
Dept: LAB | Facility: MEDICAL CENTER | Age: 32
End: 2022-11-02
Attending: PHYSICIAN ASSISTANT
Payer: COMMERCIAL

## 2022-11-02 DIAGNOSIS — Z00.00 PREVENTATIVE HEALTH CARE: ICD-10-CM

## 2022-11-02 LAB
ALBUMIN SERPL BCP-MCNC: 4.6 G/DL (ref 3.2–4.9)
ALBUMIN/GLOB SERPL: 1.4 G/DL
ALP SERPL-CCNC: 44 U/L (ref 30–99)
ALT SERPL-CCNC: 8 U/L (ref 2–50)
ANION GAP SERPL CALC-SCNC: 12 MMOL/L (ref 7–16)
AST SERPL-CCNC: 14 U/L (ref 12–45)
BASOPHILS # BLD AUTO: 1.7 % (ref 0–1.8)
BASOPHILS # BLD: 0.08 K/UL (ref 0–0.12)
BILIRUB SERPL-MCNC: 0.3 MG/DL (ref 0.1–1.5)
BUN SERPL-MCNC: 11 MG/DL (ref 8–22)
CALCIUM SERPL-MCNC: 9.3 MG/DL (ref 8.5–10.5)
CHLORIDE SERPL-SCNC: 105 MMOL/L (ref 96–112)
CHOLEST SERPL-MCNC: 191 MG/DL (ref 100–199)
CO2 SERPL-SCNC: 22 MMOL/L (ref 20–33)
CREAT SERPL-MCNC: 0.77 MG/DL (ref 0.5–1.4)
EOSINOPHIL # BLD AUTO: 0.08 K/UL (ref 0–0.51)
EOSINOPHIL NFR BLD: 1.7 % (ref 0–6.9)
ERYTHROCYTE [DISTWIDTH] IN BLOOD BY AUTOMATED COUNT: 45.8 FL (ref 35.9–50)
FASTING STATUS PATIENT QL REPORTED: NORMAL
GFR SERPLBLD CREATININE-BSD FMLA CKD-EPI: 104 ML/MIN/1.73 M 2
GLOBULIN SER CALC-MCNC: 3.3 G/DL (ref 1.9–3.5)
GLUCOSE SERPL-MCNC: 105 MG/DL (ref 65–99)
HCT VFR BLD AUTO: 44.2 % (ref 37–47)
HDLC SERPL-MCNC: 73 MG/DL
HGB BLD-MCNC: 14.7 G/DL (ref 12–16)
IMM GRANULOCYTES # BLD AUTO: 0.01 K/UL (ref 0–0.11)
IMM GRANULOCYTES NFR BLD AUTO: 0.2 % (ref 0–0.9)
LDLC SERPL CALC-MCNC: 104 MG/DL
LYMPHOCYTES # BLD AUTO: 1.91 K/UL (ref 1–4.8)
LYMPHOCYTES NFR BLD: 40 % (ref 22–41)
MCH RBC QN AUTO: 30.8 PG (ref 27–33)
MCHC RBC AUTO-ENTMCNC: 33.3 G/DL (ref 33.6–35)
MCV RBC AUTO: 92.5 FL (ref 81.4–97.8)
MONOCYTES # BLD AUTO: 0.3 K/UL (ref 0–0.85)
MONOCYTES NFR BLD AUTO: 6.3 % (ref 0–13.4)
NEUTROPHILS # BLD AUTO: 2.39 K/UL (ref 2–7.15)
NEUTROPHILS NFR BLD: 50.1 % (ref 44–72)
NRBC # BLD AUTO: 0 K/UL
NRBC BLD-RTO: 0 /100 WBC
PLATELET # BLD AUTO: 393 K/UL (ref 164–446)
PMV BLD AUTO: 9.3 FL (ref 9–12.9)
POTASSIUM SERPL-SCNC: 4.3 MMOL/L (ref 3.6–5.5)
PROT SERPL-MCNC: 7.9 G/DL (ref 6–8.2)
RBC # BLD AUTO: 4.78 M/UL (ref 4.2–5.4)
SODIUM SERPL-SCNC: 139 MMOL/L (ref 135–145)
TRIGL SERPL-MCNC: 70 MG/DL (ref 0–149)
TSH SERPL DL<=0.005 MIU/L-ACNC: 1.83 UIU/ML (ref 0.38–5.33)
WBC # BLD AUTO: 4.8 K/UL (ref 4.8–10.8)

## 2022-11-02 PROCEDURE — 85025 COMPLETE CBC W/AUTO DIFF WBC: CPT

## 2022-11-02 PROCEDURE — 80061 LIPID PANEL: CPT

## 2022-11-02 PROCEDURE — 84443 ASSAY THYROID STIM HORMONE: CPT

## 2022-11-02 PROCEDURE — 36415 COLL VENOUS BLD VENIPUNCTURE: CPT

## 2022-11-02 PROCEDURE — 80053 COMPREHEN METABOLIC PANEL: CPT

## 2022-11-17 ENCOUNTER — OFFICE VISIT (OUTPATIENT)
Dept: MEDICAL GROUP | Facility: MEDICAL CENTER | Age: 32
End: 2022-11-17
Payer: COMMERCIAL

## 2022-11-17 VITALS
TEMPERATURE: 98.2 F | DIASTOLIC BLOOD PRESSURE: 68 MMHG | HEIGHT: 60 IN | BODY MASS INDEX: 21.68 KG/M2 | HEART RATE: 80 BPM | RESPIRATION RATE: 16 BRPM | WEIGHT: 110.45 LBS | SYSTOLIC BLOOD PRESSURE: 102 MMHG | OXYGEN SATURATION: 95 %

## 2022-11-17 DIAGNOSIS — R73.01 ELEVATED FASTING BLOOD SUGAR: ICD-10-CM

## 2022-11-17 DIAGNOSIS — Z00.00 WELL ADULT EXAM: ICD-10-CM

## 2022-11-17 DIAGNOSIS — Z23 NEED FOR VACCINATION: ICD-10-CM

## 2022-11-17 PROCEDURE — 90471 IMMUNIZATION ADMIN: CPT | Performed by: PHYSICIAN ASSISTANT

## 2022-11-17 PROCEDURE — 99395 PREV VISIT EST AGE 18-39: CPT | Mod: 25 | Performed by: PHYSICIAN ASSISTANT

## 2022-11-17 PROCEDURE — 90686 IIV4 VACC NO PRSV 0.5 ML IM: CPT | Performed by: PHYSICIAN ASSISTANT

## 2022-11-17 ASSESSMENT — PATIENT HEALTH QUESTIONNAIRE - PHQ9: CLINICAL INTERPRETATION OF PHQ2 SCORE: 0

## 2022-11-17 ASSESSMENT — FIBROSIS 4 INDEX: FIB4 SCORE: 0.42

## 2022-11-17 NOTE — PROGRESS NOTES
Subjective:     CC:   Chief Complaint   Patient presents with    Lab Results       HPI:   Renee Cheatham is a 32 y.o. female who presents for annual exam    Pt has GYN provider: no  Last pap: up to date       Pt no longer get menses since has been on lo loestrin, endometriosis has been doing much better    Exercises twice a week and goes on a walk with her dog every day.  States diet is higher in carbs and rice, could do better.    She  has no past medical history on file.  She  has no past surgical history on file.    History reviewed. No pertinent family history.    Social History     Socioeconomic History    Marital status:      Spouse name: Not on file    Number of children: Not on file    Years of education: Not on file    Highest education level: Not on file   Occupational History    Not on file   Tobacco Use    Smoking status: Never    Smokeless tobacco: Never   Vaping Use    Vaping Use: Never used   Substance and Sexual Activity    Alcohol use: No    Drug use: No    Sexual activity: Yes     Partners: Male   Other Topics Concern    Not on file   Social History Narrative    Not on file     Social Determinants of Health     Financial Resource Strain: Not on file   Food Insecurity: Not on file   Transportation Needs: Not on file   Physical Activity: Not on file   Stress: Not on file   Social Connections: Not on file   Intimate Partner Violence: Not on file   Housing Stability: Not on file       There are no problems to display for this patient.        Current Outpatient Medications   Medication Sig Dispense Refill    Norethin-Eth Estrad-Fe Biphas (LO LOESTRIN FE) 1 MG-10 MCG / 10 MCG Tab Take 1 Tablet by mouth every day. 84 Tablet 3    Acetaminophen (TYLENOL PO) Take  by mouth.       No current facility-administered medications for this visit.     No Known Allergies    Review of Systems   Constitutional: Negative for fever, chills and malaise/fatigue.   HENT: Negative for congestion.    Eyes:  Negative for pain.   Respiratory: Negative for cough and shortness of breath.    Cardiovascular: Negative for leg swelling.   Gastrointestinal: Negative for nausea, vomiting, abdominal pain and diarrhea.   Genitourinary: Negative for dysuria and hematuria.   Skin: Negative for rash.   Neurological: Negative for dizziness, focal weakness and headaches.   Endo/Heme/Allergies: Does not bruise/bleed easily.   Psychiatric/Behavioral: Negative for depression.  The patient is not nervous/anxious.      Objective:     /68 (BP Location: Left arm, Patient Position: Sitting)   Pulse 80   Temp 36.8 °C (98.2 °F) (Temporal)   Resp 16   Ht 1.524 m (5')   Wt 50.1 kg (110 lb 7.2 oz)   SpO2 95%   BMI 21.57 kg/m²   Body mass index is 21.57 kg/m².  Wt Readings from Last 4 Encounters:   11/17/22 50.1 kg (110 lb 7.2 oz)   12/08/21 50.6 kg (111 lb 8.8 oz)   09/21/21 49.4 kg (108 lb 14.5 oz)   01/15/21 52.2 kg (115 lb)       Physical Exam:  Constitutional: Well-developed and well-nourished. No distress.   Skin: Skin is warm and dry. No rashes in visible areas.  Nail: w/o pitting, ridging or onychomycosis   Head: Atraumatic without lesions.  Eyes: Equal, round and reactive, conjunctiva clear,sclera non icteric, lids normal.  Ears:  External ears unremarkable. Tympanic membranes clear and intact.  Nose: Nares patent. Septum midline. Turbinates without erythema nor edema. No discharge.    Mouth/Throat: Dentition is good. Tongue normal. Oropharynx is clear and moist. Posterior pharynx without erythema or exudates.  Neck: Supple, trachea midline.  No thyromegaly present. No lymphadenopathy--cervical or supraclavicular  Cardiovascular: Regular rate and rhythm, without any murmurs.  No lower extremity edema. Cap refill < 3sec  Lung:  Clear to auscultation throughout w/o any wheeze or rhonchi. No adventitious sounds.    Abdomen: Soft, non tender, and without distention. Active bowel sounds in all four quadrants. No rebound, guarding,  masses or HSM. No CVA tenderness  Musculoskeletal: All major joints without pain or swelling  Neurological: speech normal, mental status intact, gait grossly normal  Psychiatric:   Alert and oriented x3, normal affect and mood and behavior      Assessment and Plan:     1. Need for vaccination  INFLUENZA VACCINE QUAD INJ (PF)      2. Well adult exam        3. Elevated fasting blood sugar            HCM:    Discussed:  regular exercise   healthy diet, cutting back on rice and carbs to help improve fasting blood sugar  Sun protection w SPF  Safety belts  Dental visit, brushing flossing   Substance Abuse: Declined  Safe in relationship. Yes  Seat belts, bike helmet, gun safety discussed.  Last lab results were reviewed by me today          Follow-up: Return if symptoms worsen or fail to improve.

## 2023-05-01 ENCOUNTER — APPOINTMENT (OUTPATIENT)
Dept: RADIOLOGY | Facility: MEDICAL CENTER | Age: 33
End: 2023-05-01
Attending: OBSTETRICS & GYNECOLOGY
Payer: COMMERCIAL

## 2023-05-01 DIAGNOSIS — N80.101: ICD-10-CM

## 2023-05-01 PROCEDURE — 76856 US EXAM PELVIC COMPLETE: CPT

## 2023-09-13 ENCOUNTER — HOSPITAL ENCOUNTER (OUTPATIENT)
Dept: LAB | Facility: MEDICAL CENTER | Age: 33
End: 2023-09-13
Attending: OBSTETRICS & GYNECOLOGY
Payer: COMMERCIAL

## 2023-10-03 ENCOUNTER — OFFICE VISIT (OUTPATIENT)
Dept: MEDICAL GROUP | Facility: MEDICAL CENTER | Age: 33
End: 2023-10-03
Payer: COMMERCIAL

## 2023-10-03 VITALS
SYSTOLIC BLOOD PRESSURE: 108 MMHG | HEART RATE: 80 BPM | OXYGEN SATURATION: 97 % | RESPIRATION RATE: 14 BRPM | BODY MASS INDEX: 23.75 KG/M2 | WEIGHT: 121 LBS | HEIGHT: 60 IN | DIASTOLIC BLOOD PRESSURE: 62 MMHG | TEMPERATURE: 97.5 F

## 2023-10-03 DIAGNOSIS — R68.89 FLU-LIKE SYMPTOMS: ICD-10-CM

## 2023-10-03 DIAGNOSIS — J02.9 SORE THROAT: ICD-10-CM

## 2023-10-03 LAB
FLUAV RNA SPEC QL NAA+PROBE: NEGATIVE
FLUBV RNA SPEC QL NAA+PROBE: NEGATIVE
RSV RNA SPEC QL NAA+PROBE: NEGATIVE
S PYO DNA SPEC NAA+PROBE: NOT DETECTED
SARS-COV-2 RNA RESP QL NAA+PROBE: NEGATIVE

## 2023-10-03 PROCEDURE — 3074F SYST BP LT 130 MM HG: CPT | Performed by: STUDENT IN AN ORGANIZED HEALTH CARE EDUCATION/TRAINING PROGRAM

## 2023-10-03 PROCEDURE — 87651 STREP A DNA AMP PROBE: CPT | Performed by: STUDENT IN AN ORGANIZED HEALTH CARE EDUCATION/TRAINING PROGRAM

## 2023-10-03 PROCEDURE — 0241U POCT CEPHEID COV-2, FLU A/B, RSV - PCR: CPT | Performed by: STUDENT IN AN ORGANIZED HEALTH CARE EDUCATION/TRAINING PROGRAM

## 2023-10-03 PROCEDURE — 99213 OFFICE O/P EST LOW 20 MIN: CPT | Performed by: STUDENT IN AN ORGANIZED HEALTH CARE EDUCATION/TRAINING PROGRAM

## 2023-10-03 PROCEDURE — 3078F DIAST BP <80 MM HG: CPT | Performed by: STUDENT IN AN ORGANIZED HEALTH CARE EDUCATION/TRAINING PROGRAM

## 2023-10-03 ASSESSMENT — ENCOUNTER SYMPTOMS
MYALGIAS: 1
CHILLS: 0
SORE THROAT: 1
SHORTNESS OF BREATH: 0
NAUSEA: 0
FEVER: 0
HEADACHES: 1
COUGH: 0
FOCAL WEAKNESS: 0
VOMITING: 0
ABDOMINAL PAIN: 0

## 2023-10-03 ASSESSMENT — FIBROSIS 4 INDEX: FIB4 SCORE: 0.43

## 2023-10-03 NOTE — PROGRESS NOTES
Subjective:     CC: fever, chills, body aches, sore throat    HPI:   Renee presents today with      Patient reports fever, sore throat, myalgia for 4 days.  No coughs, chest pain, SOB.  No sick contacts. Vaccinated against covid.      ROS:  Review of Systems   Constitutional:  Positive for malaise/fatigue. Negative for chills and fever.   HENT:  Positive for sore throat. Negative for congestion.    Respiratory:  Negative for cough and shortness of breath.    Cardiovascular:  Negative for chest pain and leg swelling.   Gastrointestinal:  Negative for abdominal pain, nausea and vomiting.   Musculoskeletal:  Positive for myalgias.   Neurological:  Positive for headaches. Negative for focal weakness.       Objective:     Exam:  /62 (BP Location: Left arm, Patient Position: Sitting, BP Cuff Size: Adult)   Pulse 80   Temp 36.4 °C (97.5 °F) (Temporal)   Resp 14   Ht 1.524 m (5')   Wt 54.9 kg (121 lb)   SpO2 97%   BMI 23.63 kg/m²  Body mass index is 23.63 kg/m².    Physical Exam  Vitals reviewed.   HENT:      Head: Normocephalic.      Mouth/Throat:      Mouth: Mucous membranes are moist.      Pharynx: Oropharynx is clear. Posterior oropharyngeal erythema present. No oropharyngeal exudate.   Eyes:      Pupils: Pupils are equal, round, and reactive to light.   Cardiovascular:      Rate and Rhythm: Normal rate and regular rhythm.   Pulmonary:      Effort: Pulmonary effort is normal. No respiratory distress.      Breath sounds: No wheezing or rales.   Abdominal:      General: Abdomen is flat. Bowel sounds are normal. There is no distension.      Tenderness: There is no abdominal tenderness. There is no guarding.   Skin:     General: Skin is warm.   Neurological:      General: No focal deficit present.      Mental Status: She is alert and oriented to person, place, and time.       Assessment & Plan:     33 y.o. female with the following -     1. Sore throat  - OTC ibuprofen  - POCT CEPHEID GROUP A STREP - PCR -  negative    2. Flu-like symptoms  OTC Tylenol for fever/discomfort.  OTC cough/cold medication for symptomatic relief  OTC Supportive Care for Congestion - saline nasal spray or neti pot  Drink plenty of fluids  Return to clinic or go to the ED if symptoms worsen  - POCT CEPHEID COV-2, FLU A/B, RSV - PCR - negative      No follow-ups on file.  Follow up with PCP    Please note that this dictation was created using voice recognition software. I have made every reasonable attempt to correct obvious errors, but I expect that there are errors of grammar and possibly content that I did not discover before finalizing the note.

## 2023-10-06 ENCOUNTER — OFFICE VISIT (OUTPATIENT)
Dept: MEDICAL GROUP | Facility: MEDICAL CENTER | Age: 33
End: 2023-10-06
Payer: COMMERCIAL

## 2023-10-06 VITALS
WEIGHT: 116.6 LBS | BODY MASS INDEX: 22.89 KG/M2 | SYSTOLIC BLOOD PRESSURE: 122 MMHG | TEMPERATURE: 98.2 F | HEIGHT: 60 IN | HEART RATE: 88 BPM | DIASTOLIC BLOOD PRESSURE: 80 MMHG | OXYGEN SATURATION: 95 %

## 2023-10-06 DIAGNOSIS — B02.7 DISSEMINATED HERPES ZOSTER: ICD-10-CM

## 2023-10-06 DIAGNOSIS — R11.2 NAUSEA AND VOMITING, UNSPECIFIED VOMITING TYPE: ICD-10-CM

## 2023-10-06 PROCEDURE — 3079F DIAST BP 80-89 MM HG: CPT | Performed by: STUDENT IN AN ORGANIZED HEALTH CARE EDUCATION/TRAINING PROGRAM

## 2023-10-06 PROCEDURE — 99214 OFFICE O/P EST MOD 30 MIN: CPT | Performed by: STUDENT IN AN ORGANIZED HEALTH CARE EDUCATION/TRAINING PROGRAM

## 2023-10-06 PROCEDURE — 3074F SYST BP LT 130 MM HG: CPT | Performed by: STUDENT IN AN ORGANIZED HEALTH CARE EDUCATION/TRAINING PROGRAM

## 2023-10-06 RX ORDER — ONDANSETRON 4 MG/1
4 TABLET, FILM COATED ORAL EVERY 6 HOURS PRN
Qty: 20 TABLET | Refills: 0 | Status: SHIPPED | OUTPATIENT
Start: 2023-10-06 | End: 2023-10-11

## 2023-10-06 RX ORDER — VALACYCLOVIR HYDROCHLORIDE 1 G/1
1000 TABLET, FILM COATED ORAL 3 TIMES DAILY
Qty: 21 TABLET | Refills: 0 | Status: SHIPPED | OUTPATIENT
Start: 2023-10-06 | End: 2023-10-13

## 2023-10-06 ASSESSMENT — ENCOUNTER SYMPTOMS
CHILLS: 1
SHORTNESS OF BREATH: 0
FOCAL WEAKNESS: 0
CONSTIPATION: 0
DIAPHORESIS: 1
MYALGIAS: 1
DIARRHEA: 0
HEADACHES: 1
COUGH: 0
NAUSEA: 1
VOMITING: 1
FEVER: 1
ABDOMINAL PAIN: 0

## 2023-10-06 ASSESSMENT — FIBROSIS 4 INDEX: FIB4 SCORE: 0.43

## 2023-10-06 ASSESSMENT — PATIENT HEALTH QUESTIONNAIRE - PHQ9: CLINICAL INTERPRETATION OF PHQ2 SCORE: 0

## 2023-10-06 NOTE — PROGRESS NOTES
Subjective:     CC: rash, nausea, vomiting    HPI:   Renee presents today with    Patient developed rash, headache, vomiting for past 2 days. Her  had shingles 1.5 week ago.    ROS:  Review of Systems   Constitutional:  Positive for chills, diaphoresis and fever.   Respiratory:  Negative for cough and shortness of breath.    Cardiovascular:  Negative for chest pain and leg swelling.   Gastrointestinal:  Positive for nausea and vomiting. Negative for abdominal pain, constipation and diarrhea.   Musculoskeletal:  Positive for myalgias.   Skin:  Positive for itching and rash.   Neurological:  Positive for headaches. Negative for focal weakness.       Objective:     Exam:  /80 (BP Location: Right arm, Patient Position: Sitting, BP Cuff Size: Adult)   Pulse 88   Temp 36.8 °C (98.2 °F) (Temporal)   Ht 1.524 m (5')   Wt 52.9 kg (116 lb 9.6 oz)   SpO2 95%   BMI 22.77 kg/m²  Body mass index is 22.77 kg/m².    Physical Exam  Vitals reviewed.   HENT:      Head: Normocephalic.      Mouth/Throat:      Mouth: Mucous membranes are moist.      Pharynx: Oropharynx is clear.   Eyes:      Pupils: Pupils are equal, round, and reactive to light.   Cardiovascular:      Rate and Rhythm: Normal rate and regular rhythm.   Pulmonary:      Effort: Pulmonary effort is normal. No respiratory distress.      Breath sounds: No wheezing or rales.   Abdominal:      General: Abdomen is flat. Bowel sounds are normal. There is no distension.      Tenderness: There is no abdominal tenderness. There is no guarding.   Skin:     General: Skin is warm.      Findings: Rash (has erythamatous patches witn vesicles on back, shoulders, thighs. see picture below) present.   Neurological:      General: No focal deficit present.      Mental Status: She is alert and oriented to person, place, and time.            Media Information  File Link    Clinical Picture - Scan on 10/6/2023 2:29 PM        Key Information    Document ID File Type Document  Type Description   [id number] Image Clinical Picture      Import Information    Attached At Date Time User Dept   Encounter Level 10/6/2023  2:29 PM Alexandra Biswas M.D. [facility]     Encounter    Office Visit on 10/6/23 with Alexandra Biswas M.D.       Document Information    Photographic Image:  Clinical Picture      10/06/2023 2:29 PM   Attached To:   Office Visit on 10/6/23 with Alexandra Biswas M.D.     Source Information    Alexandra Biswas M.D.  [facility]     Accompanied by husbands  Assessment & Plan:     33 y.o. female with the following -     1. Disseminated herpes zoster  For 48 hours.  - valacyclovir (VALTREX) 1 GM Tab; Take 1 Tablet by mouth 3 times a day for 7 days.  Dispense: 21 Tablet; Refill: 0    2. Nausea and vomiting, unspecified vomiting type  - ondansetron (ZOFRAN) 4 MG Tab tablet; Take 1 Tablet by mouth every 6 hours as needed for Nausea/Vomiting for up to 5 days.  Dispense: 20 Tablet; Refill: 0      No follow-ups on file.  Follow up with PCP. Eileen    Please note that this dictation was created using voice recognition software. I have made every reasonable attempt to correct obvious errors, but I expect that there are errors of grammar and possibly content that I did not discover before finalizing the note.

## 2023-10-25 ENCOUNTER — APPOINTMENT (OUTPATIENT)
Dept: MEDICAL GROUP | Facility: MEDICAL CENTER | Age: 33
End: 2023-10-25
Payer: COMMERCIAL

## 2023-11-01 ENCOUNTER — APPOINTMENT (OUTPATIENT)
Dept: MEDICAL GROUP | Facility: MEDICAL CENTER | Age: 33
End: 2023-11-01
Payer: COMMERCIAL

## 2023-11-28 DIAGNOSIS — Z00.00 PREVENTATIVE HEALTH CARE: ICD-10-CM

## 2023-11-28 DIAGNOSIS — Z11.4 ENCOUNTER FOR SCREENING FOR HIV: ICD-10-CM

## 2023-11-28 DIAGNOSIS — Z11.59 NEED FOR HEPATITIS C SCREENING TEST: ICD-10-CM

## 2023-12-26 ENCOUNTER — APPOINTMENT (OUTPATIENT)
Dept: MEDICAL GROUP | Facility: MEDICAL CENTER | Age: 33
End: 2023-12-26
Payer: COMMERCIAL

## 2023-12-28 ENCOUNTER — HOSPITAL ENCOUNTER (OUTPATIENT)
Dept: LAB | Facility: MEDICAL CENTER | Age: 33
End: 2023-12-28
Attending: PHYSICIAN ASSISTANT
Payer: COMMERCIAL

## 2023-12-28 DIAGNOSIS — Z00.00 PREVENTATIVE HEALTH CARE: ICD-10-CM

## 2023-12-28 DIAGNOSIS — Z11.4 ENCOUNTER FOR SCREENING FOR HIV: ICD-10-CM

## 2023-12-28 LAB
25(OH)D3 SERPL-MCNC: 26 NG/ML (ref 30–100)
ALBUMIN SERPL BCP-MCNC: 4.4 G/DL (ref 3.2–4.9)
ALBUMIN/GLOB SERPL: 1.4 G/DL
ALP SERPL-CCNC: 56 U/L (ref 30–99)
ALT SERPL-CCNC: 24 U/L (ref 2–50)
ANION GAP SERPL CALC-SCNC: 11 MMOL/L (ref 7–16)
AST SERPL-CCNC: 17 U/L (ref 12–45)
BASOPHILS # BLD AUTO: 1.1 % (ref 0–1.8)
BASOPHILS # BLD: 0.06 K/UL (ref 0–0.12)
BILIRUB SERPL-MCNC: 0.3 MG/DL (ref 0.1–1.5)
BUN SERPL-MCNC: 15 MG/DL (ref 8–22)
CALCIUM ALBUM COR SERPL-MCNC: 8.8 MG/DL (ref 8.5–10.5)
CALCIUM SERPL-MCNC: 9.1 MG/DL (ref 8.5–10.5)
CHLORIDE SERPL-SCNC: 103 MMOL/L (ref 96–112)
CHOLEST SERPL-MCNC: 198 MG/DL (ref 100–199)
CO2 SERPL-SCNC: 24 MMOL/L (ref 20–33)
CREAT SERPL-MCNC: 0.7 MG/DL (ref 0.5–1.4)
EOSINOPHIL # BLD AUTO: 0.09 K/UL (ref 0–0.51)
EOSINOPHIL NFR BLD: 1.7 % (ref 0–6.9)
ERYTHROCYTE [DISTWIDTH] IN BLOOD BY AUTOMATED COUNT: 44.3 FL (ref 35.9–50)
FASTING STATUS PATIENT QL REPORTED: NORMAL
GFR SERPLBLD CREATININE-BSD FMLA CKD-EPI: 116 ML/MIN/1.73 M 2
GLOBULIN SER CALC-MCNC: 3.1 G/DL (ref 1.9–3.5)
GLUCOSE SERPL-MCNC: 98 MG/DL (ref 65–99)
HCT VFR BLD AUTO: 42.9 % (ref 37–47)
HCV AB SER QL: NORMAL
HDLC SERPL-MCNC: 79 MG/DL
HGB BLD-MCNC: 14.5 G/DL (ref 12–16)
HIV 1+2 AB+HIV1 P24 AG SERPL QL IA: NORMAL
IMM GRANULOCYTES # BLD AUTO: 0.01 K/UL (ref 0–0.11)
IMM GRANULOCYTES NFR BLD AUTO: 0.2 % (ref 0–0.9)
LDLC SERPL CALC-MCNC: 103 MG/DL
LYMPHOCYTES # BLD AUTO: 2.14 K/UL (ref 1–4.8)
LYMPHOCYTES NFR BLD: 39.8 % (ref 22–41)
MCH RBC QN AUTO: 30.9 PG (ref 27–33)
MCHC RBC AUTO-ENTMCNC: 33.8 G/DL (ref 32.2–35.5)
MCV RBC AUTO: 91.3 FL (ref 81.4–97.8)
MONOCYTES # BLD AUTO: 0.36 K/UL (ref 0–0.85)
MONOCYTES NFR BLD AUTO: 6.7 % (ref 0–13.4)
NEUTROPHILS # BLD AUTO: 2.72 K/UL (ref 1.82–7.42)
NEUTROPHILS NFR BLD: 50.5 % (ref 44–72)
NRBC # BLD AUTO: 0 K/UL
NRBC BLD-RTO: 0 /100 WBC (ref 0–0.2)
PLATELET # BLD AUTO: 383 K/UL (ref 164–446)
PMV BLD AUTO: 8.9 FL (ref 9–12.9)
POTASSIUM SERPL-SCNC: 4.1 MMOL/L (ref 3.6–5.5)
PROT SERPL-MCNC: 7.5 G/DL (ref 6–8.2)
RBC # BLD AUTO: 4.7 M/UL (ref 4.2–5.4)
SODIUM SERPL-SCNC: 138 MMOL/L (ref 135–145)
TRIGL SERPL-MCNC: 80 MG/DL (ref 0–149)
TSH SERPL DL<=0.005 MIU/L-ACNC: 2.59 UIU/ML (ref 0.38–5.33)
WBC # BLD AUTO: 5.4 K/UL (ref 4.8–10.8)

## 2023-12-28 PROCEDURE — 87389 HIV-1 AG W/HIV-1&-2 AB AG IA: CPT

## 2023-12-28 PROCEDURE — 84443 ASSAY THYROID STIM HORMONE: CPT

## 2023-12-28 PROCEDURE — 82306 VITAMIN D 25 HYDROXY: CPT

## 2023-12-28 PROCEDURE — 85025 COMPLETE CBC W/AUTO DIFF WBC: CPT

## 2023-12-28 PROCEDURE — 86803 HEPATITIS C AB TEST: CPT

## 2023-12-28 PROCEDURE — 80061 LIPID PANEL: CPT

## 2023-12-28 PROCEDURE — 80053 COMPREHEN METABOLIC PANEL: CPT

## 2023-12-28 PROCEDURE — 36415 COLL VENOUS BLD VENIPUNCTURE: CPT

## 2024-02-22 ENCOUNTER — HOSPITAL ENCOUNTER (OUTPATIENT)
Facility: MEDICAL CENTER | Age: 34
End: 2024-02-22
Attending: OBSTETRICS & GYNECOLOGY
Payer: COMMERCIAL

## 2024-02-22 PROCEDURE — 87491 CHLMYD TRACH DNA AMP PROBE: CPT

## 2024-02-22 PROCEDURE — 87661 TRICHOMONAS VAGINALIS AMPLIF: CPT

## 2024-02-22 PROCEDURE — 87624 HPV HI-RISK TYP POOLED RSLT: CPT

## 2024-02-22 PROCEDURE — 87591 N.GONORRHOEAE DNA AMP PROB: CPT

## 2024-02-22 PROCEDURE — 88175 CYTOPATH C/V AUTO FLUID REDO: CPT

## 2024-02-23 LAB — AMBIGUOUS DTTM AMBI4: NORMAL

## 2024-02-26 LAB
SPEC CONTAINER SPEC: NORMAL
SPECIMEN SOURCE: NORMAL
T VAGINALIS RRNA SPEC QL NAA+PROBE: NEGATIVE

## 2024-02-28 LAB
C TRACH RRNA CVX QL NAA+PROBE: NEGATIVE
CYTOLOGIST CVX/VAG CYTO: NORMAL
CYTOLOGY CVX/VAG DOC CYTO: NORMAL
CYTOLOGY CVX/VAG DOC THIN PREP: NORMAL
HPV I/H RISK 4 DNA CVX QL PROBE+SIG AMP: NEGATIVE
N GONORRHOEA RRNA CVX QL NAA+PROBE: NEGATIVE
NOTE NL11727A: NORMAL
OTHER STN SPEC: NORMAL
STAT OF ADQ CVX/VAG CYTO-IMP: NORMAL

## 2024-03-05 ENCOUNTER — APPOINTMENT (OUTPATIENT)
Dept: MEDICAL GROUP | Facility: MEDICAL CENTER | Age: 34
End: 2024-03-05
Payer: COMMERCIAL

## 2024-03-05 ENCOUNTER — HOSPITAL ENCOUNTER (OUTPATIENT)
Dept: LAB | Facility: MEDICAL CENTER | Age: 34
End: 2024-03-05
Attending: OBSTETRICS & GYNECOLOGY
Payer: COMMERCIAL

## 2024-03-05 LAB
ABO GROUP BLD: NORMAL
AMORPH CRY #/AREA URNS HPF: PRESENT /HPF
BACTERIA #/AREA URNS HPF: NEGATIVE /HPF
BLD GP AB SCN SERPL QL: NORMAL
EPI CELLS #/AREA URNS HPF: ABNORMAL /HPF
HIV 1+2 AB+HIV1 P24 AG SERPL QL IA: NORMAL
HYALINE CASTS #/AREA URNS LPF: ABNORMAL /LPF
RBC # URNS HPF: ABNORMAL /HPF
RH BLD: NORMAL
WBC #/AREA URNS HPF: ABNORMAL /HPF

## 2024-03-05 PROCEDURE — 86901 BLOOD TYPING SEROLOGIC RH(D): CPT

## 2024-03-05 PROCEDURE — 85025 COMPLETE CBC W/AUTO DIFF WBC: CPT

## 2024-03-05 PROCEDURE — 86592 SYPHILIS TEST NON-TREP QUAL: CPT

## 2024-03-05 PROCEDURE — 81420 FETAL CHRMOML ANEUPLOIDY: CPT

## 2024-03-05 PROCEDURE — 86762 RUBELLA ANTIBODY: CPT

## 2024-03-05 PROCEDURE — 86850 RBC ANTIBODY SCREEN: CPT

## 2024-03-05 PROCEDURE — 87340 HEPATITIS B SURFACE AG IA: CPT

## 2024-03-05 PROCEDURE — 86900 BLOOD TYPING SEROLOGIC ABO: CPT

## 2024-03-05 PROCEDURE — 86803 HEPATITIS C AB TEST: CPT

## 2024-03-05 PROCEDURE — 87389 HIV-1 AG W/HIV-1&-2 AB AG IA: CPT

## 2024-03-05 PROCEDURE — 36415 COLL VENOUS BLD VENIPUNCTURE: CPT

## 2024-03-05 PROCEDURE — 81001 URINALYSIS AUTO W/SCOPE: CPT

## 2024-03-05 PROCEDURE — 86780 TREPONEMA PALLIDUM: CPT

## 2024-03-06 LAB
APPEARANCE UR: ABNORMAL
BASOPHILS # BLD AUTO: 0.9 % (ref 0–1.8)
BASOPHILS # BLD: 0.07 K/UL (ref 0–0.12)
BILIRUB UR QL STRIP.AUTO: NEGATIVE
COLOR UR: YELLOW
EOSINOPHIL # BLD AUTO: 0.13 K/UL (ref 0–0.51)
EOSINOPHIL NFR BLD: 1.6 % (ref 0–6.9)
ERYTHROCYTE [DISTWIDTH] IN BLOOD BY AUTOMATED COUNT: 42.9 FL (ref 35.9–50)
GLUCOSE UR STRIP.AUTO-MCNC: NEGATIVE MG/DL
HBV SURFACE AG SER QL: ABNORMAL
HCT VFR BLD AUTO: 41.5 % (ref 37–47)
HCV AB SER QL: NORMAL
HGB BLD-MCNC: 14.4 G/DL (ref 12–16)
IMM GRANULOCYTES # BLD AUTO: 0.03 K/UL (ref 0–0.11)
IMM GRANULOCYTES NFR BLD AUTO: 0.4 % (ref 0–0.9)
KETONES UR STRIP.AUTO-MCNC: NEGATIVE MG/DL
LEUKOCYTE ESTERASE UR QL STRIP.AUTO: ABNORMAL
LYMPHOCYTES # BLD AUTO: 1.9 K/UL (ref 1–4.8)
LYMPHOCYTES NFR BLD: 23.9 % (ref 22–41)
MCH RBC QN AUTO: 30.9 PG (ref 27–33)
MCHC RBC AUTO-ENTMCNC: 34.7 G/DL (ref 32.2–35.5)
MCV RBC AUTO: 89.1 FL (ref 81.4–97.8)
MICRO URNS: ABNORMAL
MONOCYTES # BLD AUTO: 0.47 K/UL (ref 0–0.85)
MONOCYTES NFR BLD AUTO: 5.9 % (ref 0–13.4)
NEUTROPHILS # BLD AUTO: 5.34 K/UL (ref 1.82–7.42)
NEUTROPHILS NFR BLD: 67.3 % (ref 44–72)
NITRITE UR QL STRIP.AUTO: NEGATIVE
NRBC # BLD AUTO: 0 K/UL
NRBC BLD-RTO: 0 /100 WBC (ref 0–0.2)
PH UR STRIP.AUTO: 7 [PH] (ref 5–8)
PLATELET # BLD AUTO: 422 K/UL (ref 164–446)
PMV BLD AUTO: 9.2 FL (ref 9–12.9)
PROT UR QL STRIP: NEGATIVE MG/DL
RBC # BLD AUTO: 4.66 M/UL (ref 4.2–5.4)
RBC UR QL AUTO: NEGATIVE
RUBV AB SER QL: 20.1 IU/ML
SP GR UR STRIP.AUTO: 1.02
T PALLIDUM AB SER QL IA: ABNORMAL
UROBILINOGEN UR STRIP.AUTO-MCNC: 0.2 MG/DL
WBC # BLD AUTO: 7.9 K/UL (ref 4.8–10.8)

## 2024-03-16 LAB
ANNOTATION COMMENT IMP: NORMAL
FET CHR X + Y ANEUP RISK PLAS.CFDNA QN: NORMAL
FET SEX US: NORMAL
FET TS 13 RISK PLAS.CFDNA QL: NORMAL
FET TS 18 RISK PLAS.CFDNA QL: NORMAL
FET TS 21 RISK PLAS.CFDNA QL: NORMAL
FETAL ANEUPLOIDY - TRIPLOIDY NV11651: NORMAL
FETAL FRACTION Q0204: 7.3 %
GA (DAYS): 0 D
GA (WEEKS): 12 WK
NUMBER OF FETUSES Q0671: 1
PATHOLOGY STUDY: NORMAL
REPORT FETAL SEX NL11652: YES

## 2024-03-29 ENCOUNTER — HOSPITAL ENCOUNTER (OUTPATIENT)
Dept: LAB | Facility: MEDICAL CENTER | Age: 34
End: 2024-03-29
Attending: OBSTETRICS & GYNECOLOGY
Payer: COMMERCIAL

## 2024-03-29 PROCEDURE — 36415 COLL VENOUS BLD VENIPUNCTURE: CPT

## 2024-03-29 PROCEDURE — 82105 ALPHA-FETOPROTEIN SERUM: CPT

## 2024-04-01 LAB
# FETUSES US: NORMAL
AFP MOM SERPL: 0.57
AFP SERPL-MCNC: 20 NG/ML
AGE - REPORTED: 35.5 YR
CURRENT SMOKER: NO
FAMILY MEMBER DISEASES HX: NO
GA METHOD: NORMAL
GA: NORMAL WK
IDDM PATIENT QL: NO
INTEGRATED SCN PATIENT-IMP: NORMAL
SPECIMEN DRAWN SERPL: NORMAL

## 2024-06-04 ENCOUNTER — HOSPITAL ENCOUNTER (OUTPATIENT)
Dept: LAB | Facility: MEDICAL CENTER | Age: 34
End: 2024-06-04
Attending: OBSTETRICS & GYNECOLOGY
Payer: COMMERCIAL

## 2024-06-04 LAB
BASOPHILS # BLD AUTO: 0.7 % (ref 0–1.8)
BASOPHILS # BLD: 0.06 K/UL (ref 0–0.12)
EOSINOPHIL # BLD AUTO: 0.09 K/UL (ref 0–0.51)
EOSINOPHIL NFR BLD: 1 % (ref 0–6.9)
ERYTHROCYTE [DISTWIDTH] IN BLOOD BY AUTOMATED COUNT: 49.3 FL (ref 35.9–50)
GLUCOSE 1H P 50 G GLC PO SERPL-MCNC: 181 MG/DL (ref 70–139)
HCT VFR BLD AUTO: 37.6 % (ref 37–47)
HGB BLD-MCNC: 12.1 G/DL (ref 12–16)
IMM GRANULOCYTES # BLD AUTO: 0.26 K/UL (ref 0–0.11)
IMM GRANULOCYTES NFR BLD AUTO: 2.9 % (ref 0–0.9)
LYMPHOCYTES # BLD AUTO: 1.54 K/UL (ref 1–4.8)
LYMPHOCYTES NFR BLD: 17.2 % (ref 22–41)
MCH RBC QN AUTO: 30.9 PG (ref 27–33)
MCHC RBC AUTO-ENTMCNC: 32.2 G/DL (ref 32.2–35.5)
MCV RBC AUTO: 95.9 FL (ref 81.4–97.8)
MONOCYTES # BLD AUTO: 0.38 K/UL (ref 0–0.85)
MONOCYTES NFR BLD AUTO: 4.2 % (ref 0–13.4)
NEUTROPHILS # BLD AUTO: 6.62 K/UL (ref 1.82–7.42)
NEUTROPHILS NFR BLD: 74 % (ref 44–72)
NRBC # BLD AUTO: 0 K/UL
NRBC BLD-RTO: 0 /100 WBC (ref 0–0.2)
PLATELET # BLD AUTO: 353 K/UL (ref 164–446)
PMV BLD AUTO: 9.5 FL (ref 9–12.9)
RBC # BLD AUTO: 3.92 M/UL (ref 4.2–5.4)
T PALLIDUM AB SER QL IA: NORMAL
WBC # BLD AUTO: 9 K/UL (ref 4.8–10.8)

## 2024-06-04 PROCEDURE — 86780 TREPONEMA PALLIDUM: CPT

## 2024-06-04 PROCEDURE — 85025 COMPLETE CBC W/AUTO DIFF WBC: CPT

## 2024-06-04 PROCEDURE — 36415 COLL VENOUS BLD VENIPUNCTURE: CPT

## 2024-06-04 PROCEDURE — 82950 GLUCOSE TEST: CPT

## 2024-06-26 ENCOUNTER — APPOINTMENT (OUTPATIENT)
Dept: RADIOLOGY | Facility: MEDICAL CENTER | Age: 34
End: 2024-06-26
Attending: OBSTETRICS & GYNECOLOGY
Payer: COMMERCIAL

## 2024-06-26 ENCOUNTER — HOSPITAL ENCOUNTER (EMERGENCY)
Facility: MEDICAL CENTER | Age: 34
End: 2024-06-26
Attending: EMERGENCY MEDICINE
Payer: COMMERCIAL

## 2024-06-26 ENCOUNTER — APPOINTMENT (OUTPATIENT)
Dept: RADIOLOGY | Facility: MEDICAL CENTER | Age: 34
End: 2024-06-26
Attending: EMERGENCY MEDICINE
Payer: COMMERCIAL

## 2024-06-26 ENCOUNTER — HOSPITAL ENCOUNTER (OUTPATIENT)
Facility: MEDICAL CENTER | Age: 34
End: 2024-06-28
Attending: OBSTETRICS & GYNECOLOGY | Admitting: OBSTETRICS & GYNECOLOGY
Payer: COMMERCIAL

## 2024-06-26 VITALS
SYSTOLIC BLOOD PRESSURE: 107 MMHG | WEIGHT: 139 LBS | DIASTOLIC BLOOD PRESSURE: 72 MMHG | BODY MASS INDEX: 27.29 KG/M2 | HEIGHT: 60 IN | HEART RATE: 87 BPM | RESPIRATION RATE: 18 BRPM | OXYGEN SATURATION: 98 % | TEMPERATURE: 97 F
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
## 2024-06-26 DIAGNOSIS — Z3A.24 24 WEEKS GESTATION OF PREGNANCY: ICD-10-CM

## 2024-06-26 DIAGNOSIS — R10.84 GENERALIZED ABDOMINAL PAIN: Primary | ICD-10-CM

## 2024-06-26 DIAGNOSIS — S80.12XA CONTUSION OF LEFT LOWER EXTREMITY, INITIAL ENCOUNTER: ICD-10-CM

## 2024-06-26 LAB
ABO GROUP BLD: NORMAL
ALBUMIN SERPL BCP-MCNC: 3.9 G/DL (ref 3.2–4.9)
ALBUMIN/GLOB SERPL: 1.1 G/DL
ALP SERPL-CCNC: 89 U/L (ref 30–99)
ALT SERPL-CCNC: 19 U/L (ref 2–50)
ANION GAP SERPL CALC-SCNC: 15 MMOL/L (ref 7–16)
APTT PPP: 25.7 SEC (ref 24.7–36)
AST SERPL-CCNC: 23 U/L (ref 12–45)
BILIRUB SERPL-MCNC: <0.2 MG/DL (ref 0.1–1.5)
BLD GP AB SCN SERPL QL: NORMAL
BUN SERPL-MCNC: 9 MG/DL (ref 8–22)
CALCIUM ALBUM COR SERPL-MCNC: 9.8 MG/DL (ref 8.5–10.5)
CALCIUM SERPL-MCNC: 9.7 MG/DL (ref 8.5–10.5)
CHLORIDE SERPL-SCNC: 101 MMOL/L (ref 96–112)
CO2 SERPL-SCNC: 19 MMOL/L (ref 20–33)
CREAT SERPL-MCNC: 0.49 MG/DL (ref 0.5–1.4)
ERYTHROCYTE [DISTWIDTH] IN BLOOD BY AUTOMATED COUNT: 49.1 FL (ref 35.9–50)
ETHANOL BLD-MCNC: <10.1 MG/DL
GFR SERPLBLD CREATININE-BSD FMLA CKD-EPI: 126 ML/MIN/1.73 M 2
GLOBULIN SER CALC-MCNC: 3.6 G/DL (ref 1.9–3.5)
GLUCOSE SERPL-MCNC: 88 MG/DL (ref 65–99)
HCG SERPL QL: POSITIVE
HCT VFR BLD AUTO: 40.8 % (ref 37–47)
HGB BLD-MCNC: 13.5 G/DL (ref 12–16)
INR PPP: 0.95 (ref 0.87–1.13)
MCH RBC QN AUTO: 31 PG (ref 27–33)
MCHC RBC AUTO-ENTMCNC: 33.1 G/DL (ref 32.2–35.5)
MCV RBC AUTO: 93.6 FL (ref 81.4–97.8)
PLATELET # BLD AUTO: 332 K/UL (ref 164–446)
PMV BLD AUTO: 9.4 FL (ref 9–12.9)
POTASSIUM SERPL-SCNC: 4 MMOL/L (ref 3.6–5.5)
PROT SERPL-MCNC: 7.5 G/DL (ref 6–8.2)
PROTHROMBIN TIME: 12.7 SEC (ref 12–14.6)
RBC # BLD AUTO: 4.36 M/UL (ref 4.2–5.4)
RH BLD: NORMAL
SODIUM SERPL-SCNC: 135 MMOL/L (ref 135–145)
WBC # BLD AUTO: 11.2 K/UL (ref 4.8–10.8)

## 2024-06-26 PROCEDURE — 73560 X-RAY EXAM OF KNEE 1 OR 2: CPT | Mod: LT

## 2024-06-26 PROCEDURE — 76816 OB US FOLLOW-UP PER FETUS: CPT

## 2024-06-26 PROCEDURE — 84703 CHORIONIC GONADOTROPIN ASSAY: CPT

## 2024-06-26 PROCEDURE — 82077 ASSAY SPEC XCP UR&BREATH IA: CPT

## 2024-06-26 PROCEDURE — 86900 BLOOD TYPING SEROLOGIC ABO: CPT

## 2024-06-26 PROCEDURE — 76705 ECHO EXAM OF ABDOMEN: CPT

## 2024-06-26 PROCEDURE — 86850 RBC ANTIBODY SCREEN: CPT

## 2024-06-26 PROCEDURE — 76819 FETAL BIOPHYS PROFIL W/O NST: CPT

## 2024-06-26 PROCEDURE — 307740 HCHG GREEN TRAUMA TEAM SERVICES

## 2024-06-26 PROCEDURE — 99284 EMERGENCY DEPT VISIT MOD MDM: CPT

## 2024-06-26 PROCEDURE — 36415 COLL VENOUS BLD VENIPUNCTURE: CPT

## 2024-06-26 PROCEDURE — 302449 STATCHG TRIAGE ONLY (STATISTIC)

## 2024-06-26 PROCEDURE — 80053 COMPREHEN METABOLIC PANEL: CPT

## 2024-06-26 PROCEDURE — 85730 THROMBOPLASTIN TIME PARTIAL: CPT

## 2024-06-26 PROCEDURE — 86901 BLOOD TYPING SEROLOGIC RH(D): CPT

## 2024-06-26 PROCEDURE — 85610 PROTHROMBIN TIME: CPT

## 2024-06-26 PROCEDURE — 85027 COMPLETE CBC AUTOMATED: CPT

## 2024-06-26 ASSESSMENT — FIBROSIS 4 INDEX: FIB4 SCORE: 0.34
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
## 2024-06-27 PROCEDURE — 700105 HCHG RX REV CODE 258: Performed by: OBSTETRICS & GYNECOLOGY

## 2024-06-27 PROCEDURE — G0378 HOSPITAL OBSERVATION PER HR: HCPCS

## 2024-06-27 RX ORDER — SODIUM CHLORIDE, SODIUM LACTATE, POTASSIUM CHLORIDE, CALCIUM CHLORIDE 600; 310; 30; 20 MG/100ML; MG/100ML; MG/100ML; MG/100ML
1000 INJECTION, SOLUTION INTRAVENOUS ONCE
Status: COMPLETED | OUTPATIENT
Start: 2024-06-27 | End: 2024-06-27

## 2024-06-27 RX ADMIN — SODIUM CHLORIDE, POTASSIUM CHLORIDE, SODIUM LACTATE AND CALCIUM CHLORIDE 1000 ML: 600; 310; 30; 20 INJECTION, SOLUTION INTRAVENOUS at 01:02

## 2024-06-27 ASSESSMENT — PAIN SCALES - GENERAL: PAINLEVEL: 6

## 2024-06-27 ASSESSMENT — FIBROSIS 4 INDEX: FIB4 SCORE: 0.56

## 2024-06-27 ASSESSMENT — PATIENT HEALTH QUESTIONNAIRE - PHQ9
SUM OF ALL RESPONSES TO PHQ9 QUESTIONS 1 AND 2: 0
1. LITTLE INTEREST OR PLEASURE IN DOING THINGS: NOT AT ALL
2. FEELING DOWN, DEPRESSED, IRRITABLE, OR HOPELESS: NOT AT ALL

## 2024-06-27 ASSESSMENT — PAIN DESCRIPTION - PAIN TYPE: TYPE: ACUTE PAIN
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
## 2024-06-27 NOTE — DISCHARGE INSTRUCTIONS
Thankfully your vital signs, labs and imaging are all normal.  This is reassuring.  He will be monitored by the L&D department for continued management.  If you have any worsening symptoms or concerns please return to the ED.  Thank for coming in today.
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
## 2024-06-27 NOTE — ED NOTES
ELIAZAR Mcfarlane, who was previously at pt bedside to monitor baby from L&D called re: pt coming upstairs. This RN told to send pt up and that there is a room and she is expected
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
## 2024-06-28 VITALS
SYSTOLIC BLOOD PRESSURE: 114 MMHG | HEIGHT: 60 IN | DIASTOLIC BLOOD PRESSURE: 75 MMHG | TEMPERATURE: 97.2 F | WEIGHT: 139 LBS | OXYGEN SATURATION: 97 % | HEART RATE: 109 BPM | RESPIRATION RATE: 18 BRPM | BODY MASS INDEX: 27.29 KG/M2

## 2024-06-28 PROCEDURE — G0378 HOSPITAL OBSERVATION PER HR: HCPCS
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
## 2024-06-28 NOTE — PROGRESS NOTES
1900 - Report received from ELIAZAR Martinez  1930 - Assessment completed. Pt reports good fm. Denies any vag bleeding or loss of fluid. Reorts occasional cramping or ctx. Off monitor to shower  2008 - pt returned to bed and monitoring resumed  0655 - Report to ELIAZAR Saab

## 2024-06-28 NOTE — CARE PLAN
Problem: Knowledge Deficit - L&D  Goal: Patient and family/caregivers will demonstrate understanding of plan of care, disease process/condition, diagnostic tests and medications  Outcome: Progressing  Note: POC discussed. Questions answered     Problem: Pain - Standard  Goal: Alleviation of pain or a reduction in pain to the patient’s comfort goal  Outcome: Progressing  Note: Pt reports overall soreness but denies need for pain meds at present. Will notify RN if she does need anything   The patient is Stable - Low risk of patient condition declining or worsening    Shift Goals  Clinical Goals: maternal and fetal monitoring  Patient Goals: rest  Family Goals: support    Progress made toward(s) clinical / shift goals:  FHR reassuring    Patient is not progressing towards the following goals:n/a
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
## 2024-06-28 NOTE — PROGRESS NOTES
0700- Report received from ELIAZAR Acuña. Patient resting quietly in bed in no overt distress, denies needs. Denies uterine contractions, denies LOF or VB, endorses positive fetal movement.     0816- Discharge instructions reviewed with patient and FOB, verbalized understanding and agreement with discharge plan. Wheelchair transport provided to car for patient comfort. All belongings taken with patient. Off the unit in stable condition in no distress.

## 2024-07-08 ENCOUNTER — HOSPITAL ENCOUNTER (OUTPATIENT)
Dept: LAB | Facility: MEDICAL CENTER | Age: 34
End: 2024-07-08
Attending: OBSTETRICS & GYNECOLOGY
Payer: COMMERCIAL

## 2024-07-08 LAB
GLUCOSE 1H P CHAL SERPL-MCNC: 204 MG/DL (ref 65–180)
GLUCOSE 2H P CHAL SERPL-MCNC: 153 MG/DL (ref 65–155)
GLUCOSE 3H P CHAL SERPL-MCNC: 115 MG/DL (ref 65–140)
GLUCOSE BS SERPL-MCNC: 89 MG/DL (ref 65–95)

## 2024-07-08 PROCEDURE — 36415 COLL VENOUS BLD VENIPUNCTURE: CPT

## 2024-07-08 PROCEDURE — 82952 GTT-ADDED SAMPLES: CPT

## 2024-07-08 PROCEDURE — 82951 GLUCOSE TOLERANCE TEST (GTT): CPT

## 2024-07-16 ENCOUNTER — OFFICE VISIT (OUTPATIENT)
Dept: MEDICAL GROUP | Facility: MEDICAL CENTER | Age: 34
End: 2024-07-16
Payer: COMMERCIAL

## 2024-07-16 VITALS
TEMPERATURE: 98 F | RESPIRATION RATE: 18 BRPM | HEIGHT: 60 IN | SYSTOLIC BLOOD PRESSURE: 116 MMHG | OXYGEN SATURATION: 97 % | DIASTOLIC BLOOD PRESSURE: 62 MMHG | WEIGHT: 144.3 LBS | HEART RATE: 102 BPM | BODY MASS INDEX: 28.33 KG/M2

## 2024-07-16 DIAGNOSIS — M25.562 PAIN IN LATERAL PORTION OF LEFT KNEE: ICD-10-CM

## 2024-07-16 DIAGNOSIS — Z3A.31 31 WEEKS GESTATION OF PREGNANCY: ICD-10-CM

## 2024-07-16 DIAGNOSIS — T14.8XXA HEMATOMA: ICD-10-CM
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
## 2024-07-16 PROBLEM — R11.2 NAUSEA AND VOMITING: Status: RESOLVED | Noted: 2023-10-06 | Resolved: 2024-07-16

## 2024-07-16 PROBLEM — J02.9 SORE THROAT: Status: RESOLVED | Noted: 2023-10-03 | Resolved: 2024-07-16

## 2024-07-16 PROBLEM — R68.89 FLU-LIKE SYMPTOMS: Status: RESOLVED | Noted: 2023-10-03 | Resolved: 2024-07-16

## 2024-07-16 PROCEDURE — 3078F DIAST BP <80 MM HG: CPT | Performed by: PHYSICIAN ASSISTANT

## 2024-07-16 PROCEDURE — 3074F SYST BP LT 130 MM HG: CPT | Performed by: PHYSICIAN ASSISTANT

## 2024-07-16 PROCEDURE — 99214 OFFICE O/P EST MOD 30 MIN: CPT | Performed by: PHYSICIAN ASSISTANT

## 2024-07-16 ASSESSMENT — FIBROSIS 4 INDEX: FIB4 SCORE: 0.56

## 2024-08-14 ENCOUNTER — HOSPITAL ENCOUNTER (OUTPATIENT)
Facility: MEDICAL CENTER | Age: 34
End: 2024-08-14
Attending: OBSTETRICS & GYNECOLOGY
Payer: COMMERCIAL

## 2024-08-14 PROCEDURE — 87150 DNA/RNA AMPLIFIED PROBE: CPT

## 2024-08-14 PROCEDURE — 87081 CULTURE SCREEN ONLY: CPT

## 2024-08-16 LAB — GP B STREP DNA SPEC QL NAA+PROBE: NEGATIVE

## 2024-09-09 ENCOUNTER — HOSPITAL ENCOUNTER (EMERGENCY)
Facility: MEDICAL CENTER | Age: 34
End: 2024-09-09
Attending: OBSTETRICS & GYNECOLOGY | Admitting: OBSTETRICS & GYNECOLOGY
Payer: COMMERCIAL

## 2024-09-09 VITALS
HEART RATE: 97 BPM | DIASTOLIC BLOOD PRESSURE: 85 MMHG | BODY MASS INDEX: 30.43 KG/M2 | RESPIRATION RATE: 16 BRPM | OXYGEN SATURATION: 98 % | HEIGHT: 60 IN | SYSTOLIC BLOOD PRESSURE: 125 MMHG | WEIGHT: 155 LBS | TEMPERATURE: 98 F

## 2024-09-09 LAB
ALBUMIN SERPL BCP-MCNC: 3.5 G/DL (ref 3.2–4.9)
ALBUMIN/GLOB SERPL: 1 G/DL
ALP SERPL-CCNC: 182 U/L (ref 30–99)
ALT SERPL-CCNC: 10 U/L (ref 2–50)
AMORPH CRY #/AREA URNS HPF: PRESENT /HPF
ANION GAP SERPL CALC-SCNC: 16 MMOL/L (ref 7–16)
APPEARANCE UR: ABNORMAL
APPEARANCE UR: CLEAR
AST SERPL-CCNC: 23 U/L (ref 12–45)
BACTERIA #/AREA URNS HPF: ABNORMAL /HPF
BACTERIA #/AREA URNS HPF: NEGATIVE /HPF
BASOPHILS # BLD AUTO: 0.6 % (ref 0–1.8)
BASOPHILS # BLD: 0.04 K/UL (ref 0–0.12)
BILIRUB SERPL-MCNC: 0.2 MG/DL (ref 0.1–1.5)
BILIRUB UR QL STRIP.AUTO: NEGATIVE
BILIRUB UR QL STRIP.AUTO: NEGATIVE
BUN SERPL-MCNC: 10 MG/DL (ref 8–22)
CALCIUM ALBUM COR SERPL-MCNC: 9.8 MG/DL (ref 8.5–10.5)
CALCIUM SERPL-MCNC: 9.4 MG/DL (ref 8.5–10.5)
CHLORIDE SERPL-SCNC: 102 MMOL/L (ref 96–112)
CO2 SERPL-SCNC: 16 MMOL/L (ref 20–33)
COLOR UR: YELLOW
COLOR UR: YELLOW
CREAT SERPL-MCNC: 0.61 MG/DL (ref 0.5–1.4)
CREAT UR-MCNC: 18.49 MG/DL
CREAT UR-MCNC: 24.33 MG/DL
EOSINOPHIL # BLD AUTO: 0.05 K/UL (ref 0–0.51)
EOSINOPHIL NFR BLD: 0.8 % (ref 0–6.9)
EPI CELLS #/AREA URNS HPF: ABNORMAL /HPF
EPI CELLS #/AREA URNS HPF: NEGATIVE /HPF
ERYTHROCYTE [DISTWIDTH] IN BLOOD BY AUTOMATED COUNT: 53.2 FL (ref 35.9–50)
GFR SERPLBLD CREATININE-BSD FMLA CKD-EPI: 119 ML/MIN/1.73 M 2
GLOBULIN SER CALC-MCNC: 3.6 G/DL (ref 1.9–3.5)
GLUCOSE SERPL-MCNC: 90 MG/DL (ref 65–99)
GLUCOSE UR STRIP.AUTO-MCNC: NEGATIVE MG/DL
GLUCOSE UR STRIP.AUTO-MCNC: NEGATIVE MG/DL
HCT VFR BLD AUTO: 38.9 % (ref 37–47)
HGB BLD-MCNC: 13.2 G/DL (ref 12–16)
HYALINE CASTS #/AREA URNS LPF: ABNORMAL /LPF
HYALINE CASTS #/AREA URNS LPF: ABNORMAL /LPF
IMM GRANULOCYTES # BLD AUTO: 0.05 K/UL (ref 0–0.11)
IMM GRANULOCYTES NFR BLD AUTO: 0.8 % (ref 0–0.9)
KETONES UR STRIP.AUTO-MCNC: NEGATIVE MG/DL
KETONES UR STRIP.AUTO-MCNC: NEGATIVE MG/DL
LEUKOCYTE ESTERASE UR QL STRIP.AUTO: ABNORMAL
LEUKOCYTE ESTERASE UR QL STRIP.AUTO: NEGATIVE
LYMPHOCYTES # BLD AUTO: 1.41 K/UL (ref 1–4.8)
LYMPHOCYTES NFR BLD: 21.4 % (ref 22–41)
MCH RBC QN AUTO: 30.6 PG (ref 27–33)
MCHC RBC AUTO-ENTMCNC: 33.9 G/DL (ref 32.2–35.5)
MCV RBC AUTO: 90 FL (ref 81.4–97.8)
MICRO URNS: ABNORMAL
MICRO URNS: ABNORMAL
MONOCYTES # BLD AUTO: 0.56 K/UL (ref 0–0.85)
MONOCYTES NFR BLD AUTO: 8.5 % (ref 0–13.4)
NEUTROPHILS # BLD AUTO: 4.47 K/UL (ref 1.82–7.42)
NEUTROPHILS NFR BLD: 67.9 % (ref 44–72)
NITRITE UR QL STRIP.AUTO: NEGATIVE
NITRITE UR QL STRIP.AUTO: NEGATIVE
NRBC # BLD AUTO: 0 K/UL
NRBC BLD-RTO: 0 /100 WBC (ref 0–0.2)
PH UR STRIP.AUTO: 6.5 [PH] (ref 5–8)
PH UR STRIP.AUTO: 6.5 [PH] (ref 5–8)
PLATELET # BLD AUTO: 262 K/UL (ref 164–446)
PMV BLD AUTO: 10.9 FL (ref 9–12.9)
POTASSIUM SERPL-SCNC: 4.4 MMOL/L (ref 3.6–5.5)
PROT SERPL-MCNC: 7.1 G/DL (ref 6–8.2)
PROT UR QL STRIP: 30 MG/DL
PROT UR QL STRIP: 30 MG/DL
PROT UR-MCNC: 26 MG/DL (ref 0–15)
PROT UR-MCNC: 26 MG/DL (ref 0–15)
PROT/CREAT UR: 1069 MG/G (ref 10–107)
PROT/CREAT UR: 1406 MG/G (ref 10–107)
RBC # BLD AUTO: 4.32 M/UL (ref 4.2–5.4)
RBC # URNS HPF: ABNORMAL /HPF
RBC # URNS HPF: ABNORMAL /HPF
RBC UR QL AUTO: ABNORMAL
RBC UR QL AUTO: ABNORMAL
SODIUM SERPL-SCNC: 134 MMOL/L (ref 135–145)
SP GR UR STRIP.AUTO: 1
SP GR UR STRIP.AUTO: 1.01
URATE SERPL-MCNC: 5.3 MG/DL (ref 1.9–8.2)
UROBILINOGEN UR STRIP.AUTO-MCNC: 0.2 MG/DL
UROBILINOGEN UR STRIP.AUTO-MCNC: 0.2 MG/DL
WBC # BLD AUTO: 6.6 K/UL (ref 4.8–10.8)
WBC #/AREA URNS HPF: ABNORMAL /HPF
WBC #/AREA URNS HPF: ABNORMAL /HPF

## 2024-09-09 PROCEDURE — 85025 COMPLETE CBC W/AUTO DIFF WBC: CPT

## 2024-09-09 PROCEDURE — 80053 COMPREHEN METABOLIC PANEL: CPT

## 2024-09-09 PROCEDURE — 81001 URINALYSIS AUTO W/SCOPE: CPT | Mod: 91

## 2024-09-09 PROCEDURE — 87086 URINE CULTURE/COLONY COUNT: CPT

## 2024-09-09 PROCEDURE — 84156 ASSAY OF PROTEIN URINE: CPT | Mod: 91

## 2024-09-09 PROCEDURE — 84550 ASSAY OF BLOOD/URIC ACID: CPT

## 2024-09-09 PROCEDURE — 36415 COLL VENOUS BLD VENIPUNCTURE: CPT

## 2024-09-09 PROCEDURE — 82570 ASSAY OF URINE CREATININE: CPT

## 2024-09-09 PROCEDURE — 302449 STATCHG TRIAGE ONLY (STATISTIC)

## 2024-09-09 PROCEDURE — 59025 FETAL NON-STRESS TEST: CPT

## 2024-09-09 ASSESSMENT — FIBROSIS 4 INDEX: FIB4 SCORE: 0.97

## 2024-09-09 ASSESSMENT — PAIN SCALES - GENERAL: PAINLEVEL: 2

## 2024-09-09 NOTE — PROGRESS NOTES
Pt presents to L&D from the office for PIH work up. Pt ambulated to [facility] for assessment.     1302 TOCO and EFM applied, VSS. Pt reports +FM, denies LOF or VB. Pt denies any PIH symptoms including HA, visual disturbances, epigastric pain or sudden/increased swelling. Pt's only complaints today is pain and bleeding during urination, which started yesterday. Orders placed per MD and all questions answered.     1345 RN at bedside, pt denies any further needs.     1428 Dr. Mehta updated on lab results and UA. Orders for straight cath repeat labs.     1440 RN at bedside, POC discussed with pt and the need for a straight cath for more accurate testing. Straight cath performed, small blood clot noted in urine bag. Tests sent.     1635 Dr. Mehta updated on lab results, most likely UTI. Plan for macrobid and wait for urine culture. Discharge orders received.     1637 RN at bedside, POC discussed. All questions answered. Labor precautions as well as PIH symptoms discussed and recommendations on when to return to L&D.     1645 Pt discharged home in stable condition.

## 2024-09-11 ENCOUNTER — APPOINTMENT (OUTPATIENT)
Dept: MEDICAL GROUP | Facility: MEDICAL CENTER | Age: 34
End: 2024-09-11
Payer: COMMERCIAL

## 2024-09-11 LAB
BACTERIA UR CULT: NORMAL
SIGNIFICANT IND 70042: NORMAL
SITE SITE: NORMAL
SOURCE SOURCE: NORMAL

## 2024-09-12 ENCOUNTER — APPOINTMENT (OUTPATIENT)
Dept: RADIOLOGY | Facility: MEDICAL CENTER | Age: 34
End: 2024-09-12
Attending: OBSTETRICS & GYNECOLOGY
Payer: COMMERCIAL

## 2024-09-12 ENCOUNTER — HOSPITAL ENCOUNTER (INPATIENT)
Facility: MEDICAL CENTER | Age: 34
LOS: 4 days | End: 2024-09-16
Attending: OBSTETRICS & GYNECOLOGY | Admitting: OBSTETRICS & GYNECOLOGY
Payer: COMMERCIAL

## 2024-09-12 DIAGNOSIS — G89.18 PAIN FOLLOWING SURGERY OR PROCEDURE: ICD-10-CM

## 2024-09-12 LAB
ABO GROUP BLD: NORMAL
ALBUMIN SERPL BCP-MCNC: 3.5 G/DL (ref 3.2–4.9)
ALBUMIN/GLOB SERPL: 1 G/DL
ALP SERPL-CCNC: 187 U/L (ref 30–99)
ALT SERPL-CCNC: 12 U/L (ref 2–50)
ANION GAP SERPL CALC-SCNC: 15 MMOL/L (ref 7–16)
APPEARANCE UR: ABNORMAL
APTT PPP: 27.2 SEC (ref 24.7–36)
AST SERPL-CCNC: 19 U/L (ref 12–45)
BACTERIA #/AREA URNS HPF: NEGATIVE /HPF
BASOPHILS # BLD AUTO: 0.5 % (ref 0–1.8)
BASOPHILS # BLD: 0.03 K/UL (ref 0–0.12)
BILIRUB SERPL-MCNC: 0.2 MG/DL (ref 0.1–1.5)
BILIRUB UR QL STRIP.AUTO: NEGATIVE
BLD GP AB SCN SERPL QL: NORMAL
BUN SERPL-MCNC: 13 MG/DL (ref 8–22)
CALCIUM ALBUM COR SERPL-MCNC: 9.3 MG/DL (ref 8.5–10.5)
CALCIUM SERPL-MCNC: 8.9 MG/DL (ref 8.5–10.5)
CHLORIDE SERPL-SCNC: 102 MMOL/L (ref 96–112)
CO2 SERPL-SCNC: 18 MMOL/L (ref 20–33)
COLOR UR: ABNORMAL
CREAT SERPL-MCNC: 0.6 MG/DL (ref 0.5–1.4)
CREAT UR-MCNC: 89.5 MG/DL
EOSINOPHIL # BLD AUTO: 0.03 K/UL (ref 0–0.51)
EOSINOPHIL NFR BLD: 0.5 % (ref 0–6.9)
EPI CELLS #/AREA URNS HPF: ABNORMAL /HPF
ERYTHROCYTE [DISTWIDTH] IN BLOOD BY AUTOMATED COUNT: 53.9 FL (ref 35.9–50)
GFR SERPLBLD CREATININE-BSD FMLA CKD-EPI: 120 ML/MIN/1.73 M 2
GLOBULIN SER CALC-MCNC: 3.6 G/DL (ref 1.9–3.5)
GLUCOSE SERPL-MCNC: 74 MG/DL (ref 65–99)
GLUCOSE UR STRIP.AUTO-MCNC: NEGATIVE MG/DL
HCT VFR BLD AUTO: 40.3 % (ref 37–47)
HGB BLD-MCNC: 13.1 G/DL (ref 12–16)
HYALINE CASTS #/AREA URNS LPF: ABNORMAL /LPF
IMM GRANULOCYTES # BLD AUTO: 0.04 K/UL (ref 0–0.11)
IMM GRANULOCYTES NFR BLD AUTO: 0.6 % (ref 0–0.9)
INR PPP: 0.93 (ref 0.87–1.13)
KETONES UR STRIP.AUTO-MCNC: ABNORMAL MG/DL
LDH SERPL L TO P-CCNC: 231 U/L (ref 107–266)
LEUKOCYTE ESTERASE UR QL STRIP.AUTO: ABNORMAL
LYMPHOCYTES # BLD AUTO: 1.6 K/UL (ref 1–4.8)
LYMPHOCYTES NFR BLD: 24.5 % (ref 22–41)
MCH RBC QN AUTO: 29.8 PG (ref 27–33)
MCHC RBC AUTO-ENTMCNC: 32.5 G/DL (ref 32.2–35.5)
MCV RBC AUTO: 91.6 FL (ref 81.4–97.8)
MICRO URNS: ABNORMAL
MONOCYTES # BLD AUTO: 0.31 K/UL (ref 0–0.85)
MONOCYTES NFR BLD AUTO: 4.7 % (ref 0–13.4)
NEUTROPHILS # BLD AUTO: 4.52 K/UL (ref 1.82–7.42)
NEUTROPHILS NFR BLD: 69.2 % (ref 44–72)
NITRITE UR QL STRIP.AUTO: NEGATIVE
NRBC # BLD AUTO: 0 K/UL
NRBC BLD-RTO: 0 /100 WBC (ref 0–0.2)
PH UR STRIP.AUTO: 6 [PH] (ref 5–8)
PLATELET # BLD AUTO: 254 K/UL (ref 164–446)
PMV BLD AUTO: 10.9 FL (ref 9–12.9)
POTASSIUM SERPL-SCNC: 4.3 MMOL/L (ref 3.6–5.5)
PROT SERPL-MCNC: 7.1 G/DL (ref 6–8.2)
PROT UR QL STRIP: 100 MG/DL
PROT UR-MCNC: 139 MG/DL (ref 0–15)
PROT/CREAT UR: 1553 MG/G (ref 10–107)
PROTHROMBIN TIME: 12.5 SEC (ref 12–14.6)
RBC # BLD AUTO: 4.4 M/UL (ref 4.2–5.4)
RBC # URNS HPF: ABNORMAL /HPF
RBC UR QL AUTO: ABNORMAL
RENAL EPI CELLS #/AREA URNS HPF: ABNORMAL /HPF
RH BLD: NORMAL
SODIUM SERPL-SCNC: 135 MMOL/L (ref 135–145)
SP GR UR STRIP.AUTO: 1.02
T PALLIDUM AB SER QL IA: NONREACTIVE
URATE SERPL-MCNC: 6.3 MG/DL (ref 1.9–8.2)
UROBILINOGEN UR STRIP.AUTO-MCNC: 0.2 MG/DL
WBC # BLD AUTO: 6.5 K/UL (ref 4.8–10.8)
WBC #/AREA URNS HPF: ABNORMAL /HPF

## 2024-09-12 PROCEDURE — 80053 COMPREHEN METABOLIC PANEL: CPT

## 2024-09-12 PROCEDURE — 86780 TREPONEMA PALLIDUM: CPT

## 2024-09-12 PROCEDURE — 84550 ASSAY OF BLOOD/URIC ACID: CPT

## 2024-09-12 PROCEDURE — 85610 PROTHROMBIN TIME: CPT

## 2024-09-12 PROCEDURE — A9270 NON-COVERED ITEM OR SERVICE: HCPCS | Performed by: OBSTETRICS & GYNECOLOGY

## 2024-09-12 PROCEDURE — 770002 HCHG ROOM/CARE - OB PRIVATE (112)

## 2024-09-12 PROCEDURE — 81001 URINALYSIS AUTO W/SCOPE: CPT

## 2024-09-12 PROCEDURE — 700111 HCHG RX REV CODE 636 W/ 250 OVERRIDE (IP): Performed by: OBSTETRICS & GYNECOLOGY

## 2024-09-12 PROCEDURE — 76775 US EXAM ABDO BACK WALL LIM: CPT

## 2024-09-12 PROCEDURE — 82570 ASSAY OF URINE CREATININE: CPT

## 2024-09-12 PROCEDURE — 85730 THROMBOPLASTIN TIME PARTIAL: CPT

## 2024-09-12 PROCEDURE — 700102 HCHG RX REV CODE 250 W/ 637 OVERRIDE(OP): Performed by: OBSTETRICS & GYNECOLOGY

## 2024-09-12 PROCEDURE — 36415 COLL VENOUS BLD VENIPUNCTURE: CPT

## 2024-09-12 PROCEDURE — 700105 HCHG RX REV CODE 258: Performed by: OBSTETRICS & GYNECOLOGY

## 2024-09-12 PROCEDURE — 83615 LACTATE (LD) (LDH) ENZYME: CPT

## 2024-09-12 PROCEDURE — 700101 HCHG RX REV CODE 250: Performed by: OBSTETRICS & GYNECOLOGY

## 2024-09-12 PROCEDURE — 84156 ASSAY OF PROTEIN URINE: CPT

## 2024-09-12 PROCEDURE — 86850 RBC ANTIBODY SCREEN: CPT

## 2024-09-12 PROCEDURE — 85025 COMPLETE CBC W/AUTO DIFF WBC: CPT

## 2024-09-12 PROCEDURE — 86900 BLOOD TYPING SEROLOGIC ABO: CPT

## 2024-09-12 PROCEDURE — 86901 BLOOD TYPING SEROLOGIC RH(D): CPT

## 2024-09-12 RX ORDER — SODIUM CHLORIDE, SODIUM LACTATE, POTASSIUM CHLORIDE, CALCIUM CHLORIDE 600; 310; 30; 20 MG/100ML; MG/100ML; MG/100ML; MG/100ML
INJECTION, SOLUTION INTRAVENOUS CONTINUOUS
Status: DISCONTINUED | OUTPATIENT
Start: 2024-09-12 | End: 2024-09-16 | Stop reason: HOSPADM

## 2024-09-12 RX ORDER — PHENAZOPYRIDINE HYDROCHLORIDE 100 MG/1
100 TABLET, FILM COATED ORAL
Status: DISCONTINUED | OUTPATIENT
Start: 2024-09-12 | End: 2024-09-14

## 2024-09-12 RX ORDER — MISOPROSTOL 200 UG/1
800 TABLET ORAL
Status: DISCONTINUED | OUTPATIENT
Start: 2024-09-12 | End: 2024-09-14 | Stop reason: HOSPADM

## 2024-09-12 RX ORDER — CARBOPROST TROMETHAMINE 250 UG/ML
250 INJECTION, SOLUTION INTRAMUSCULAR
Status: DISCONTINUED | OUTPATIENT
Start: 2024-09-12 | End: 2024-09-14 | Stop reason: HOSPADM

## 2024-09-12 RX ORDER — ASPIRIN 81 MG/1
81 TABLET ORAL DAILY
Status: ON HOLD | COMMUNITY
End: 2024-09-16

## 2024-09-12 RX ORDER — ACETAMINOPHEN 500 MG
1000 TABLET ORAL
Status: DISCONTINUED | OUTPATIENT
Start: 2024-09-12 | End: 2024-09-14 | Stop reason: HOSPADM

## 2024-09-12 RX ORDER — TERBUTALINE SULFATE 1 MG/ML
0.25 INJECTION, SOLUTION SUBCUTANEOUS
Status: DISCONTINUED | OUTPATIENT
Start: 2024-09-12 | End: 2024-09-14 | Stop reason: HOSPADM

## 2024-09-12 RX ORDER — SODIUM CHLORIDE, SODIUM LACTATE, POTASSIUM CHLORIDE, AND CALCIUM CHLORIDE .6; .31; .03; .02 G/100ML; G/100ML; G/100ML; G/100ML
1000 INJECTION, SOLUTION INTRAVENOUS ONCE
Status: COMPLETED | OUTPATIENT
Start: 2024-09-12 | End: 2024-09-12

## 2024-09-12 RX ORDER — PHENAZOPYRIDINE HYDROCHLORIDE 100 MG/1
100 TABLET, FILM COATED ORAL
Status: DISCONTINUED | OUTPATIENT
Start: 2024-09-13 | End: 2024-09-12

## 2024-09-12 RX ORDER — LIDOCAINE HYDROCHLORIDE 10 MG/ML
20 INJECTION, SOLUTION INFILTRATION; PERINEURAL
Status: DISCONTINUED | OUTPATIENT
Start: 2024-09-12 | End: 2024-09-14 | Stop reason: HOSPADM

## 2024-09-12 RX ORDER — OXYTOCIN 10 [USP'U]/ML
10 INJECTION, SOLUTION INTRAMUSCULAR; INTRAVENOUS
Status: DISCONTINUED | OUTPATIENT
Start: 2024-09-12 | End: 2024-09-14 | Stop reason: HOSPADM

## 2024-09-12 RX ORDER — ONDANSETRON 2 MG/ML
4 INJECTION INTRAMUSCULAR; INTRAVENOUS EVERY 6 HOURS PRN
Status: DISCONTINUED | OUTPATIENT
Start: 2024-09-12 | End: 2024-09-14 | Stop reason: HOSPADM

## 2024-09-12 RX ORDER — ONDANSETRON 4 MG/1
4 TABLET, ORALLY DISINTEGRATING ORAL EVERY 6 HOURS PRN
Status: DISCONTINUED | OUTPATIENT
Start: 2024-09-12 | End: 2024-09-14 | Stop reason: HOSPADM

## 2024-09-12 RX ORDER — IBUPROFEN 800 MG/1
800 TABLET, FILM COATED ORAL
Status: DISCONTINUED | OUTPATIENT
Start: 2024-09-12 | End: 2024-09-14 | Stop reason: HOSPADM

## 2024-09-12 RX ADMIN — SODIUM CHLORIDE, POTASSIUM CHLORIDE, SODIUM LACTATE AND CALCIUM CHLORIDE 1000 ML: 600; 310; 30; 20 INJECTION, SOLUTION INTRAVENOUS at 13:00

## 2024-09-12 RX ADMIN — CEFTRIAXONE SODIUM 1000 MG: 10 INJECTION, POWDER, FOR SOLUTION INTRAVENOUS at 21:52

## 2024-09-12 RX ADMIN — SODIUM CHLORIDE, POTASSIUM CHLORIDE, SODIUM LACTATE AND CALCIUM CHLORIDE: 600; 310; 30; 20 INJECTION, SOLUTION INTRAVENOUS at 14:54

## 2024-09-12 RX ADMIN — MISOPROSTOL 25 MCG: 100 TABLET ORAL at 21:52

## 2024-09-12 RX ADMIN — MISOPROSTOL 25 MCG: 100 TABLET ORAL at 16:50

## 2024-09-12 RX ADMIN — PHENAZOPYRIDINE 100 MG: 100 TABLET ORAL at 22:03

## 2024-09-12 RX ADMIN — SODIUM CHLORIDE, POTASSIUM CHLORIDE, SODIUM LACTATE AND CALCIUM CHLORIDE: 600; 310; 30; 20 INJECTION, SOLUTION INTRAVENOUS at 17:56

## 2024-09-12 RX ADMIN — OXYTOCIN 2 MILLI-UNITS/MIN: 10 INJECTION, SOLUTION INTRAMUSCULAR; INTRAVENOUS at 14:57

## 2024-09-12 ASSESSMENT — LIFESTYLE VARIABLES
ALCOHOL_USE: NO
EVER_SMOKED: NEVER

## 2024-09-12 ASSESSMENT — FIBROSIS 4 INDEX: FIB4 SCORE: 0.97

## 2024-09-12 ASSESSMENT — PATIENT HEALTH QUESTIONNAIRE - PHQ9
1. LITTLE INTEREST OR PLEASURE IN DOING THINGS: NOT AT ALL
2. FEELING DOWN, DEPRESSED, IRRITABLE, OR HOPELESS: NOT AT ALL
SUM OF ALL RESPONSES TO PHQ9 QUESTIONS 1 AND 2: 0

## 2024-09-12 ASSESSMENT — PAIN SCALES - GENERAL: PAINLEVEL: 0 - NO PAIN

## 2024-09-12 NOTE — PROGRESS NOTES
EDC 2024 39w2d    Patient presents to L&D unit for IOL after being sent from Dr. Mehta's office. Patient denies contractions/LOF/VB; states normal FM. EFM monitors applied and tracing. VSS. FOB and mother at bedside. Call light at bedside, patient instructed on use. Questions answered at this time.    Patient state she feels a 7/10 burning sensation when she uses the restroom, 0/10 pain reported otherwise. Patient reports she still bright red blood in her urine every time she goes pee.    Denies HA, denies visual changes, denies epigastric pain. Lungs CTA, denies shortness of breath. DTR +2, negative clonus. Denies swelling in hands and face, bilateral LE edema trace.     1213: Bhavya Linn RN at bedside to attempt IV placement    1235: VAT at bedside. Dr. Mehta given report, updated on patient status, BP, UC pattern, FHR pattern. MD to bedside. Per MD no SVE needed at this time. MD will place cytotec.     1252: Tyson ADRIAN reviewed contraction pattern. Orders for IVF bolus at this time and to assess UC pattern. Cytotec held.    1428: Dr. Mehta called. Updated on U/S results, lab results, UC pattern, patient status. Orders for Pitocin 2x2 and MD will come round after office. Per MD no SVE necessary before starting Pitocin.     1646: Dr. Mehta at bedside.     1651: SVE by  FT/TH/high, cytotec given by Tyson ADRIAN. Pitocin turned off.    1820: Dr. Mehta updated on BP values. Per MD, update if sever range (160/110 or above) and still OK for q2hr BP at this time.     1900: Bedside report to Tahmina PEREA. Care relinquished at this time.

## 2024-09-12 NOTE — PROGRESS NOTES
Labor Progress Note    Renee Cheatham   39w2d      Subjective:  Pt was sent from the office for IOL for suspected gestational proteinuria. Pt with hematuria and painful urination. Pt has been on Macrobid for 2 days without improvement.  Pt's urine culture from 2 days ago has been no growth. Pt without PIH sx.   Uterine contractions:yes  Pain: No    Objective:   Vitals:    09/12/24 1137 09/12/24 1138 09/12/24 1139 09/12/24 1144   BP:   123/79 123/79   Pulse: 85  86 86   Resp:    16   Temp:  36.3 °C (97.3 °F)  36.3 °C (97.3 °F)   TempSrc:  Temporal  Oral   SpO2: 96%   96%   Weight:   70.3 kg (155 lb)    Height:   1.524 m (5')      FHT: 140's cat 1  [place]: q 2-3   SVE:ft/th/high, vertex by u/s     Membranes ruptured: .no      Labs:  No results found for this or any previous visit (from the past 24 hour(s)).   Latest Reference Range & Units 09/09/24 13:06   WBC 4.8 - 10.8 K/uL 6.6   RBC 4.20 - 5.40 M/uL 4.32   Hemoglobin 12.0 - 16.0 g/dL 13.2   Hematocrit 37.0 - 47.0 % 38.9   MCV 81.4 - 97.8 fL 90.0   MCH 27.0 - 33.0 pg 30.6   MCHC 32.2 - 35.5 g/dL 33.9   RDW 35.9 - 50.0 fL 53.2 (H)   Platelet Count 164 - 446 K/uL 262   MPV 9.0 - 12.9 fL 10.9   Neutrophils-Polys 44.00 - 72.00 % 67.90   Neutrophils (Absolute) 1.82 - 7.42 K/uL 4.47   Lymphocytes 22.00 - 41.00 % 21.40 (L)   Lymphs (Absolute) 1.00 - 4.80 K/uL 1.41   Monocytes 0.00 - 13.40 % 8.50   Monos (Absolute) 0.00 - 0.85 K/uL 0.56   Eosinophils 0.00 - 6.90 % 0.80   Eos (Absolute) 0.00 - 0.51 K/uL 0.05   Basophils 0.00 - 1.80 % 0.60   Baso (Absolute) 0.00 - 0.12 K/uL 0.04   Immature Granulocytes 0.00 - 0.90 % 0.80   Immature Granulocytes (abs) 0.00 - 0.11 K/uL 0.05   Nucleated RBC 0.00 - 0.20 /100 WBC 0.00   NRBC (Absolute) K/uL 0.00   Sodium 135 - 145 mmol/L 134 (L)   Potassium 3.6 - 5.5 mmol/L 4.4   Chloride 96 - 112 mmol/L 102   Co2 20 - 33 mmol/L 16 (L)   Anion Gap 7.0 - 16.0  16.0   Glucose 65 - 99 mg/dL 90   Bun 8 - 22 mg/dL 10   Creatinine 0.50 - 1.40  mg/dL 0.61   GFR (CKD-EPI) >60 mL/min/1.73 m 2 119   Calcium 8.5 - 10.5 mg/dL 9.4   Correct Calcium 8.5 - 10.5 mg/dL 9.8   AST(SGOT) 12 - 45 U/L 23   ALT(SGPT) 2 - 50 U/L 10   Alkaline Phosphatase 30 - 99 U/L 182 (H)   Total Bilirubin 0.1 - 1.5 mg/dL 0.2   Albumin 3.2 - 4.9 g/dL 3.5   Total Protein 6.0 - 8.2 g/dL 7.1   Globulin 1.9 - 3.5 g/dL 3.6 (H)   A-G Ratio g/dL 1.0   Uric Acid 1.9 - 8.2 mg/dL 5.3   (H): Data is abnormally high  (L): Data is abnormally low   Latest Reference Range & Units 09/09/24 14:42   Color  Yellow   Character  Clear   Specific Gravity <1.035  1.006   Ph 5.0 - 8.0  6.5   Glucose Negative mg/dL Negative   Ketones Negative mg/dL Negative   Bilirubin Negative  Negative   Occult Blood Negative  Large !   Protein Negative mg/dL 30 !   Nitrite Negative  Negative   Leukocyte Esterase Negative  Negative   Urobilinogen, Urine Negative  0.2   Micro Urine Req  Microscopic   WBC /hpf 0-2   RBC /hpf 20-50 !   Epithelial Cells /hpf Negative   Bacteria None /hpf Negative   Hyaline Cast /lpf 0-2   !: Data is abnormal   09/09/24 14:42   Significant Indicator NEG   Site URINE, STRAIGHT CATH   Source UR      Latest Reference Range & Units 09/09/24 14:42   Creatinine, Random Urine mg/dL 24.33   Total Protein, Urine 0.0 - 15.0 mg/dL 26.0 (H)   Protein Creatinine Ratio 10 - 107 mg/g 1069 (H)   Urobilinogen, Urine Negative  0.2   (H): Data is abnormally high    Assessment/Plan:   39w2d/hematuria/gestational proteinuria-Pt has been admitted. [facility] labs pending. Will order a renal u/s and move towards delivery with cytotec and then Pitocin. Pt and spouse agree with the plan and all questions answered.   Fetal status-reassuring      Joann Mehta M.D.

## 2024-09-12 NOTE — CARE PLAN
The patient is Watcher - Medium risk of patient condition declining or worsening      Problem: Knowledge Deficit - L&D  Goal: Patient and family/caregivers will demonstrate understanding of plan of care, disease process/condition, diagnostic tests and medications  Outcome: Progressing     Problem: Psychosocial - L&D  Goal: Patient's level of anxiety will decrease  Outcome: Progressing  Goal: Patient will be able to discuss coping skills during hospitalization  Outcome: Progressing  Goal: Patient's ability to re-evaluate and adapt role responsibilities will improve  Outcome: Progressing  Goal: Spiritual and cultural needs incorporated into hospitalization  Outcome: Progressing     Problem: Risk for Venous Thromboembolism (VTE)  Goal: VTE prevention measures will be implemented and patient will remain free from VTE  Outcome: Progressing     Problem: Risk for Infection and Impaired Wound Healing  Goal: Patient will remain free from infection  Outcome: Progressing  Goal: Patient's wound/surgical incision will decrease in size and heals properly  Outcome: Progressing     Problem: Risk for Fluid Imbalance  Goal: Patient's fluid volume balance will be maintained or improve  Outcome: Progressing     Problem: Risk for Injury  Goal: Patient and fetus will be free of preventable injury/complications  Outcome: Progressing  Note: cEFM per order. Monitors adjusted as necessary.     Problem: Pain  Goal: Patient's pain will be alleviated or reduced to the patient’s comfort goal  Outcome: Progressing  Note: Patient educated on pain management including non-pharmacologic and pharmacologic options. Patient undecided about pain management preferences at this time. Pain assessed per protocol. Patient educated to notify RN if/when interventions are needed.      Problem: Discharge Barriers/Planning  Goal: Patient's continuum of care needs are met  Outcome: Progressing

## 2024-09-13 ENCOUNTER — ANESTHESIA (OUTPATIENT)
Dept: OBGYN | Facility: MEDICAL CENTER | Age: 34
End: 2024-09-13
Payer: COMMERCIAL

## 2024-09-13 ENCOUNTER — ANESTHESIA EVENT (OUTPATIENT)
Dept: OBGYN | Facility: MEDICAL CENTER | Age: 34
End: 2024-09-13
Payer: COMMERCIAL

## 2024-09-13 LAB
BASOPHILS # BLD AUTO: 0.2 % (ref 0–1.8)
BASOPHILS # BLD: 0.03 K/UL (ref 0–0.12)
EOSINOPHIL # BLD AUTO: 0 K/UL (ref 0–0.51)
EOSINOPHIL NFR BLD: 0 % (ref 0–6.9)
ERYTHROCYTE [DISTWIDTH] IN BLOOD BY AUTOMATED COUNT: 53.8 FL (ref 35.9–50)
HCT VFR BLD AUTO: 34.6 % (ref 37–47)
HGB BLD-MCNC: 11.4 G/DL (ref 12–16)
IMM GRANULOCYTES # BLD AUTO: 0.04 K/UL (ref 0–0.11)
IMM GRANULOCYTES NFR BLD AUTO: 0.3 % (ref 0–0.9)
LYMPHOCYTES # BLD AUTO: 0.85 K/UL (ref 1–4.8)
LYMPHOCYTES NFR BLD: 6.8 % (ref 22–41)
MCH RBC QN AUTO: 30.2 PG (ref 27–33)
MCHC RBC AUTO-ENTMCNC: 32.9 G/DL (ref 32.2–35.5)
MCV RBC AUTO: 91.8 FL (ref 81.4–97.8)
MONOCYTES # BLD AUTO: 0.43 K/UL (ref 0–0.85)
MONOCYTES NFR BLD AUTO: 3.4 % (ref 0–13.4)
NEUTROPHILS # BLD AUTO: 11.2 K/UL (ref 1.82–7.42)
NEUTROPHILS NFR BLD: 89.3 % (ref 44–72)
NRBC # BLD AUTO: 0 K/UL
NRBC BLD-RTO: 0 /100 WBC (ref 0–0.2)
PLATELET # BLD AUTO: 197 K/UL (ref 164–446)
PMV BLD AUTO: 10.1 FL (ref 9–12.9)
RBC # BLD AUTO: 3.77 M/UL (ref 4.2–5.4)
WBC # BLD AUTO: 12.6 K/UL (ref 4.8–10.8)

## 2024-09-13 PROCEDURE — 700105 HCHG RX REV CODE 258: Performed by: OBSTETRICS & GYNECOLOGY

## 2024-09-13 PROCEDURE — 700111 HCHG RX REV CODE 636 W/ 250 OVERRIDE (IP): Performed by: OBSTETRICS & GYNECOLOGY

## 2024-09-13 PROCEDURE — 160035 HCHG PACU - 1ST 60 MINS PHASE I: Performed by: OBSTETRICS & GYNECOLOGY

## 2024-09-13 PROCEDURE — 700101 HCHG RX REV CODE 250: Performed by: ANESTHESIOLOGY

## 2024-09-13 PROCEDURE — 36415 COLL VENOUS BLD VENIPUNCTURE: CPT

## 2024-09-13 PROCEDURE — A9270 NON-COVERED ITEM OR SERVICE: HCPCS | Performed by: OBSTETRICS & GYNECOLOGY

## 2024-09-13 PROCEDURE — 700105 HCHG RX REV CODE 258: Performed by: ANESTHESIOLOGY

## 2024-09-13 PROCEDURE — 700102 HCHG RX REV CODE 250 W/ 637 OVERRIDE(OP): Performed by: ANESTHESIOLOGY

## 2024-09-13 PROCEDURE — 85347 COAGULATION TIME ACTIVATED: CPT

## 2024-09-13 PROCEDURE — 700111 HCHG RX REV CODE 636 W/ 250 OVERRIDE (IP): Mod: JZ | Performed by: ANESTHESIOLOGY

## 2024-09-13 PROCEDURE — 59514 CESAREAN DELIVERY ONLY: CPT | Mod: 80 | Performed by: OBSTETRICS & GYNECOLOGY

## 2024-09-13 PROCEDURE — 160048 HCHG OR STATISTICAL LEVEL 1-5: Performed by: OBSTETRICS & GYNECOLOGY

## 2024-09-13 PROCEDURE — 85730 THROMBOPLASTIN TIME PARTIAL: CPT

## 2024-09-13 PROCEDURE — 88307 TISSUE EXAM BY PATHOLOGIST: CPT

## 2024-09-13 PROCEDURE — 700102 HCHG RX REV CODE 250 W/ 637 OVERRIDE(OP): Performed by: OBSTETRICS & GYNECOLOGY

## 2024-09-13 PROCEDURE — 160009 HCHG ANES TIME/MIN: Performed by: OBSTETRICS & GYNECOLOGY

## 2024-09-13 PROCEDURE — 160029 HCHG SURGERY MINUTES - 1ST 30 MINS LEVEL 4: Performed by: OBSTETRICS & GYNECOLOGY

## 2024-09-13 PROCEDURE — 303615 HCHG EPIDURAL/SPINAL ANESTHESIA FOR LABOR

## 2024-09-13 PROCEDURE — 770002 HCHG ROOM/CARE - OB PRIVATE (112)

## 2024-09-13 PROCEDURE — 85384 FIBRINOGEN ACTIVITY: CPT

## 2024-09-13 PROCEDURE — 85610 PROTHROMBIN TIME: CPT

## 2024-09-13 PROCEDURE — C1755 CATHETER, INTRASPINAL: HCPCS | Performed by: OBSTETRICS & GYNECOLOGY

## 2024-09-13 PROCEDURE — A9270 NON-COVERED ITEM OR SERVICE: HCPCS | Performed by: ANESTHESIOLOGY

## 2024-09-13 PROCEDURE — 160002 HCHG RECOVERY MINUTES (STAT): Performed by: OBSTETRICS & GYNECOLOGY

## 2024-09-13 PROCEDURE — 85025 COMPLETE CBC W/AUTO DIFF WBC: CPT

## 2024-09-13 PROCEDURE — 85576 BLOOD PLATELET AGGREGATION: CPT | Mod: 91

## 2024-09-13 PROCEDURE — 160041 HCHG SURGERY MINUTES - EA ADDL 1 MIN LEVEL 4: Performed by: OBSTETRICS & GYNECOLOGY

## 2024-09-13 PROCEDURE — 700111 HCHG RX REV CODE 636 W/ 250 OVERRIDE (IP): Performed by: ANESTHESIOLOGY

## 2024-09-13 RX ORDER — HYDRALAZINE HYDROCHLORIDE 20 MG/ML
5 INJECTION INTRAMUSCULAR; INTRAVENOUS
Status: DISCONTINUED | OUTPATIENT
Start: 2024-09-13 | End: 2024-09-14 | Stop reason: HOSPADM

## 2024-09-13 RX ORDER — TRANEXAMIC ACID 100 MG/ML
INJECTION, SOLUTION INTRAVENOUS PRN
Status: DISCONTINUED | OUTPATIENT
Start: 2024-09-13 | End: 2024-09-13 | Stop reason: SURG

## 2024-09-13 RX ORDER — BUPIVACAINE HYDROCHLORIDE 2.5 MG/ML
INJECTION, SOLUTION EPIDURAL; INFILTRATION; INTRACAUDAL PRN
Status: DISCONTINUED | OUTPATIENT
Start: 2024-09-13 | End: 2024-09-13 | Stop reason: SURG

## 2024-09-13 RX ORDER — KETOROLAC TROMETHAMINE 15 MG/ML
INJECTION, SOLUTION INTRAMUSCULAR; INTRAVENOUS PRN
Status: DISCONTINUED | OUTPATIENT
Start: 2024-09-13 | End: 2024-09-13 | Stop reason: SURG

## 2024-09-13 RX ORDER — SCOLOPAMINE TRANSDERMAL SYSTEM 1 MG/1
PATCH, EXTENDED RELEASE TRANSDERMAL PRN
Status: DISCONTINUED | OUTPATIENT
Start: 2024-09-13 | End: 2024-09-13 | Stop reason: SURG

## 2024-09-13 RX ORDER — ONDANSETRON 2 MG/ML
INJECTION INTRAMUSCULAR; INTRAVENOUS PRN
Status: DISCONTINUED | OUTPATIENT
Start: 2024-09-13 | End: 2024-09-13 | Stop reason: SURG

## 2024-09-13 RX ORDER — METOCLOPRAMIDE HYDROCHLORIDE 5 MG/ML
10 INJECTION INTRAMUSCULAR; INTRAVENOUS ONCE
Status: COMPLETED | OUTPATIENT
Start: 2024-09-13 | End: 2024-09-13

## 2024-09-13 RX ORDER — SODIUM CHLORIDE, SODIUM GLUCONATE, SODIUM ACETATE, POTASSIUM CHLORIDE AND MAGNESIUM CHLORIDE 526; 502; 368; 37; 30 MG/100ML; MG/100ML; MG/100ML; MG/100ML; MG/100ML
1000 INJECTION, SOLUTION INTRAVENOUS ONCE
Status: COMPLETED | OUTPATIENT
Start: 2024-09-13 | End: 2024-09-13

## 2024-09-13 RX ORDER — OXYTOCIN 10 [USP'U]/ML
INJECTION, SOLUTION INTRAMUSCULAR; INTRAVENOUS PRN
Status: DISCONTINUED | OUTPATIENT
Start: 2024-09-13 | End: 2024-09-13 | Stop reason: SURG

## 2024-09-13 RX ORDER — EPHEDRINE SULFATE 50 MG/ML
5 INJECTION, SOLUTION INTRAVENOUS
Status: DISCONTINUED | OUTPATIENT
Start: 2024-09-13 | End: 2024-09-14 | Stop reason: HOSPADM

## 2024-09-13 RX ORDER — ONDANSETRON 2 MG/ML
4 INJECTION INTRAMUSCULAR; INTRAVENOUS
Status: DISCONTINUED | OUTPATIENT
Start: 2024-09-13 | End: 2024-09-14 | Stop reason: HOSPADM

## 2024-09-13 RX ORDER — OXYCODONE HCL 5 MG/5 ML
10 SOLUTION, ORAL ORAL
Status: DISCONTINUED | OUTPATIENT
Start: 2024-09-13 | End: 2024-09-14 | Stop reason: HOSPADM

## 2024-09-13 RX ORDER — OXYCODONE HCL 5 MG/5 ML
5 SOLUTION, ORAL ORAL
Status: DISCONTINUED | OUTPATIENT
Start: 2024-09-13 | End: 2024-09-14 | Stop reason: HOSPADM

## 2024-09-13 RX ORDER — DIPHENHYDRAMINE HYDROCHLORIDE 50 MG/ML
12.5 INJECTION INTRAMUSCULAR; INTRAVENOUS
Status: DISCONTINUED | OUTPATIENT
Start: 2024-09-13 | End: 2024-09-14 | Stop reason: HOSPADM

## 2024-09-13 RX ORDER — ROPIVACAINE HYDROCHLORIDE 2 MG/ML
INJECTION, SOLUTION EPIDURAL; INFILTRATION; PERINEURAL CONTINUOUS
Status: DISCONTINUED | OUTPATIENT
Start: 2024-09-13 | End: 2024-09-14

## 2024-09-13 RX ORDER — AZITHROMYCIN 500 MG/5ML
500 INJECTION, POWDER, LYOPHILIZED, FOR SOLUTION INTRAVENOUS ONCE
Status: COMPLETED | OUTPATIENT
Start: 2024-09-13 | End: 2024-09-13

## 2024-09-13 RX ORDER — ALBUTEROL SULFATE 5 MG/ML
2.5 SOLUTION RESPIRATORY (INHALATION)
Status: DISCONTINUED | OUTPATIENT
Start: 2024-09-13 | End: 2024-09-14 | Stop reason: HOSPADM

## 2024-09-13 RX ORDER — MORPHINE SULFATE 1 MG/ML
INJECTION, SOLUTION EPIDURAL; INTRATHECAL; INTRAVENOUS PRN
Status: DISCONTINUED | OUTPATIENT
Start: 2024-09-13 | End: 2024-09-13 | Stop reason: SURG

## 2024-09-13 RX ORDER — DEXAMETHASONE SODIUM PHOSPHATE 4 MG/ML
INJECTION, SOLUTION INTRA-ARTICULAR; INTRALESIONAL; INTRAMUSCULAR; INTRAVENOUS; SOFT TISSUE PRN
Status: DISCONTINUED | OUTPATIENT
Start: 2024-09-13 | End: 2024-09-13 | Stop reason: SURG

## 2024-09-13 RX ORDER — HALOPERIDOL 5 MG/ML
1 INJECTION INTRAMUSCULAR
Status: DISCONTINUED | OUTPATIENT
Start: 2024-09-13 | End: 2024-09-14 | Stop reason: HOSPADM

## 2024-09-13 RX ORDER — SODIUM CHLORIDE, SODIUM LACTATE, POTASSIUM CHLORIDE, CALCIUM CHLORIDE 600; 310; 30; 20 MG/100ML; MG/100ML; MG/100ML; MG/100ML
INJECTION, SOLUTION INTRAVENOUS CONTINUOUS
Status: DISCONTINUED | OUTPATIENT
Start: 2024-09-14 | End: 2024-09-14

## 2024-09-13 RX ORDER — SODIUM CHLORIDE, SODIUM LACTATE, POTASSIUM CHLORIDE, AND CALCIUM CHLORIDE .6; .31; .03; .02 G/100ML; G/100ML; G/100ML; G/100ML
1000 INJECTION, SOLUTION INTRAVENOUS
Status: COMPLETED | OUTPATIENT
Start: 2024-09-13 | End: 2024-09-13

## 2024-09-13 RX ORDER — SODIUM CHLORIDE, SODIUM LACTATE, POTASSIUM CHLORIDE, AND CALCIUM CHLORIDE .6; .31; .03; .02 G/100ML; G/100ML; G/100ML; G/100ML
250 INJECTION, SOLUTION INTRAVENOUS PRN
Status: DISCONTINUED | OUTPATIENT
Start: 2024-09-13 | End: 2024-09-14 | Stop reason: HOSPADM

## 2024-09-13 RX ORDER — MISOPROSTOL 200 UG/1
TABLET ORAL
Status: COMPLETED
Start: 2024-09-13 | End: 2024-09-13

## 2024-09-13 RX ORDER — SODIUM CHLORIDE, SODIUM GLUCONATE, SODIUM ACETATE, POTASSIUM CHLORIDE AND MAGNESIUM CHLORIDE 526; 502; 368; 37; 30 MG/100ML; MG/100ML; MG/100ML; MG/100ML; MG/100ML
INJECTION, SOLUTION INTRAVENOUS
Status: DISCONTINUED | OUTPATIENT
Start: 2024-09-13 | End: 2024-09-13 | Stop reason: SURG

## 2024-09-13 RX ORDER — MISOPROSTOL 100 UG/1
TABLET ORAL PRN
Status: DISCONTINUED | OUTPATIENT
Start: 2024-09-13 | End: 2024-09-13 | Stop reason: SURG

## 2024-09-13 RX ORDER — LOPERAMIDE HCL 2 MG
4 CAPSULE ORAL ONCE
Status: COMPLETED | OUTPATIENT
Start: 2024-09-13 | End: 2024-09-13

## 2024-09-13 RX ORDER — LIDOCAINE HCL/EPINEPHRINE/PF 2%-1:200K
VIAL (ML) INJECTION PRN
Status: DISCONTINUED | OUTPATIENT
Start: 2024-09-13 | End: 2024-09-13 | Stop reason: SURG

## 2024-09-13 RX ORDER — BUPIVACAINE HYDROCHLORIDE 2.5 MG/ML
INJECTION, SOLUTION EPIDURAL; INFILTRATION; INTRACAUDAL
Status: COMPLETED
Start: 2024-09-13 | End: 2024-09-13

## 2024-09-13 RX ORDER — CEFAZOLIN SODIUM 1 G/3ML
INJECTION, POWDER, FOR SOLUTION INTRAMUSCULAR; INTRAVENOUS PRN
Status: DISCONTINUED | OUTPATIENT
Start: 2024-09-13 | End: 2024-09-13 | Stop reason: SURG

## 2024-09-13 RX ORDER — CITRIC ACID/SODIUM CITRATE 334-500MG
30 SOLUTION, ORAL ORAL ONCE
Status: COMPLETED | OUTPATIENT
Start: 2024-09-13 | End: 2024-09-13

## 2024-09-13 RX ADMIN — SODIUM CHLORIDE, POTASSIUM CHLORIDE, SODIUM LACTATE AND CALCIUM CHLORIDE: 600; 310; 30; 20 INJECTION, SOLUTION INTRAVENOUS at 11:28

## 2024-09-13 RX ADMIN — SODIUM CHLORIDE, POTASSIUM CHLORIDE, SODIUM LACTATE AND CALCIUM CHLORIDE 1000 ML: 600; 310; 30; 20 INJECTION, SOLUTION INTRAVENOUS at 10:34

## 2024-09-13 RX ADMIN — BUPIVACAINE HYDROCHLORIDE 2.5 ML: 2.5 INJECTION, SOLUTION EPIDURAL; INFILTRATION; INTRACAUDAL at 11:09

## 2024-09-13 RX ADMIN — AZITHROMYCIN FOR INJECTION INJECTION, POWDER, LYOPHILIZED, FOR SOLUTION 500 MG: 500 INJECTION INTRAVENOUS at 22:22

## 2024-09-13 RX ADMIN — ONDANSETRON 4 MG: 2 INJECTION INTRAMUSCULAR; INTRAVENOUS at 22:46

## 2024-09-13 RX ADMIN — OXYTOCIN 20 UNITS: 10 INJECTION, SOLUTION INTRAMUSCULAR; INTRAVENOUS at 22:44

## 2024-09-13 RX ADMIN — MISOPROSTOL 25 MCG: 100 TABLET ORAL at 03:30

## 2024-09-13 RX ADMIN — TRANEXAMIC ACID 1000 MG: 100 INJECTION, SOLUTION INTRAVENOUS at 22:49

## 2024-09-13 RX ADMIN — CEFAZOLIN 2 G: 1 INJECTION, POWDER, FOR SOLUTION INTRAMUSCULAR; INTRAVENOUS at 22:22

## 2024-09-13 RX ADMIN — FAMOTIDINE 20 MG: 10 INJECTION, SOLUTION INTRAVENOUS at 22:05

## 2024-09-13 RX ADMIN — OXYTOCIN 1 MILLI-UNITS/MIN: 10 INJECTION, SOLUTION INTRAMUSCULAR; INTRAVENOUS at 21:01

## 2024-09-13 RX ADMIN — MORPHINE SULFATE 1.5 MG: 1 INJECTION EPIDURAL; INTRATHECAL; INTRAVENOUS at 22:51

## 2024-09-13 RX ADMIN — PHENYLEPHRINE HYDROCHLORIDE 50 MCG/MIN: 10 INJECTION INTRAVENOUS at 22:27

## 2024-09-13 RX ADMIN — BUPIVACAINE HYDROCHLORIDE 2.5 ML: 2.5 INJECTION, SOLUTION EPIDURAL; INFILTRATION; INTRACAUDAL at 11:14

## 2024-09-13 RX ADMIN — SODIUM CHLORIDE, SODIUM GLUCONATE, SODIUM ACETATE, POTASSIUM CHLORIDE AND MAGNESIUM CHLORIDE: 526; 502; 368; 37; 30 INJECTION, SOLUTION INTRAVENOUS at 23:17

## 2024-09-13 RX ADMIN — ROPIVACAINE HYDROCHLORIDE: 2 INJECTION EPIDURAL; INFILTRATION; PERINEURAL at 20:58

## 2024-09-13 RX ADMIN — OXYTOCIN 4 MILLI-UNITS/MIN: 10 INJECTION, SOLUTION INTRAMUSCULAR; INTRAVENOUS at 15:09

## 2024-09-13 RX ADMIN — SODIUM CITRATE AND CITRIC ACID MONOHYDRATE 30 ML: 334; 500 SOLUTION ORAL at 22:05

## 2024-09-13 RX ADMIN — DEXAMETHASONE SODIUM PHOSPHATE 8 MG: 4 INJECTION INTRA-ARTICULAR; INTRALESIONAL; INTRAMUSCULAR; INTRAVENOUS; SOFT TISSUE at 22:46

## 2024-09-13 RX ADMIN — ROPIVACAINE HYDROCHLORIDE: 2 INJECTION EPIDURAL; INFILTRATION; PERINEURAL at 11:14

## 2024-09-13 RX ADMIN — SODIUM CHLORIDE, SODIUM GLUCONATE, SODIUM ACETATE, POTASSIUM CHLORIDE AND MAGNESIUM CHLORIDE: 526; 502; 368; 37; 30 INJECTION, SOLUTION INTRAVENOUS at 22:22

## 2024-09-13 RX ADMIN — PHENAZOPYRIDINE 100 MG: 100 TABLET ORAL at 19:42

## 2024-09-13 RX ADMIN — LIDOCAINE HYDROCHLORIDE AND EPINEPHRINE 15 ML: 20; 5 INJECTION, SOLUTION EPIDURAL; INFILTRATION; INTRACAUDAL; PERINEURAL at 22:22

## 2024-09-13 RX ADMIN — SODIUM CHLORIDE, SODIUM GLUCONATE, SODIUM ACETATE, POTASSIUM CHLORIDE AND MAGNESIUM CHLORIDE 1000 ML: 526; 502; 368; 37; 30 INJECTION, SOLUTION INTRAVENOUS at 22:15

## 2024-09-13 RX ADMIN — SODIUM BICARBONATE 3 ML: 84 INJECTION, SOLUTION INTRAVENOUS at 22:22

## 2024-09-13 RX ADMIN — PHENAZOPYRIDINE 100 MG: 100 TABLET ORAL at 10:09

## 2024-09-13 RX ADMIN — KETOROLAC TROMETHAMINE 15 MG: 15 INJECTION, SOLUTION INTRAMUSCULAR; INTRAVENOUS at 23:08

## 2024-09-13 RX ADMIN — METOCLOPRAMIDE 10 MG: 5 INJECTION, SOLUTION INTRAMUSCULAR; INTRAVENOUS at 22:05

## 2024-09-13 RX ADMIN — MISOPROSTOL 1000 MCG: 100 TABLET ORAL at 23:17

## 2024-09-13 RX ADMIN — LOPERAMIDE HYDROCHLORIDE 4 MG: 2 CAPSULE ORAL at 20:57

## 2024-09-13 RX ADMIN — FENTANYL CITRATE 100 MCG: 50 INJECTION, SOLUTION INTRAMUSCULAR; INTRAVENOUS at 22:22

## 2024-09-13 RX ADMIN — SCOPOLAMINE 1 PATCH: 1.5 PATCH, EXTENDED RELEASE TRANSDERMAL at 22:46

## 2024-09-13 NOTE — PROGRESS NOTES
Labor Progress Note    Renee Cheatham   39w2d/hematuria/gestational htn       Subjective:  Pt sleeping. Pt states cramps are getting stronger.  Pt without PIH sx.   Uterine contractions:yes  Pain: Yes. Severity: mild    Objective:   Vitals:    09/12/24 1900 09/12/24 2115 09/13/24 0000 09/13/24 0200   BP:   120/73 133/77   Pulse:   85 71   Resp:       Temp: 36.3 °C (97.4 °F) 36.4 °C (97.5 °F) 36.6 °C (97.8 °F)    TempSrc: Temporal Temporal Temporal    SpO2:       Weight:       Height:         FHT: 140's cat 1  [place]: q 2  SVE:ft/th/high    Membranes ruptured: .no    Meds:   Epidural : .no  Pitocin: .no  Cytotec 25 mcg placed per vagina    Labs:  Recent Results (from the past 24 hour(s))   CBC WITH DIFFERENTIAL    Collection Time: 09/12/24 12:42 PM   Result Value Ref Range    WBC 6.5 4.8 - 10.8 K/uL    RBC 4.40 4.20 - 5.40 M/uL    Hemoglobin 13.1 12.0 - 16.0 g/dL    Hematocrit 40.3 37.0 - 47.0 %    MCV 91.6 81.4 - 97.8 fL    MCH 29.8 27.0 - 33.0 pg    MCHC 32.5 32.2 - 35.5 g/dL    RDW 53.9 (H) 35.9 - 50.0 fL    Platelet Count 254 164 - 446 K/uL    MPV 10.9 9.0 - 12.9 fL    Neutrophils-Polys 69.20 44.00 - 72.00 %    Lymphocytes 24.50 22.00 - 41.00 %    Monocytes 4.70 0.00 - 13.40 %    Eosinophils 0.50 0.00 - 6.90 %    Basophils 0.50 0.00 - 1.80 %    Immature Granulocytes 0.60 0.00 - 0.90 %    Nucleated RBC 0.00 0.00 - 0.20 /100 WBC    Neutrophils (Absolute) 4.52 1.82 - 7.42 K/uL    Lymphs (Absolute) 1.60 1.00 - 4.80 K/uL    Monos (Absolute) 0.31 0.00 - 0.85 K/uL    Eos (Absolute) 0.03 0.00 - 0.51 K/uL    Baso (Absolute) 0.03 0.00 - 0.12 K/uL    Immature Granulocytes (abs) 0.04 0.00 - 0.11 K/uL    NRBC (Absolute) 0.00 K/uL   Comp Metabolic Panel    Collection Time: 09/12/24 12:42 PM   Result Value Ref Range    Sodium 135 135 - 145 mmol/L    Potassium 4.3 3.6 - 5.5 mmol/L    Chloride 102 96 - 112 mmol/L    Co2 18 (L) 20 - 33 mmol/L    Anion Gap 15.0 7.0 - 16.0    Glucose 74 65 - 99 mg/dL    Bun 13 8 - 22 mg/dL     Creatinine 0.60 0.50 - 1.40 mg/dL    Calcium 8.9 8.5 - 10.5 mg/dL    Correct Calcium 9.3 8.5 - 10.5 mg/dL    AST(SGOT) 19 12 - 45 U/L    ALT(SGPT) 12 2 - 50 U/L    Alkaline Phosphatase 187 (H) 30 - 99 U/L    Total Bilirubin 0.2 0.1 - 1.5 mg/dL    Albumin 3.5 3.2 - 4.9 g/dL    Total Protein 7.1 6.0 - 8.2 g/dL    Globulin 3.6 (H) 1.9 - 3.5 g/dL    A-G Ratio 1.0 g/dL   URIC ACID    Collection Time: 09/12/24 12:42 PM   Result Value Ref Range    Uric Acid 6.3 1.9 - 8.2 mg/dL   T.PALLIDUM AB AJ (Syphilis)    Collection Time: 09/12/24 12:42 PM   Result Value Ref Range    Syphilis, Treponemal Qual Nonreactive Non-Reactive   COD - Adult (Type and Screen)    Collection Time: 09/12/24 12:42 PM   Result Value Ref Range    ABO Grouping Only A     Rh Grouping Only POS     Antibody Screen-Cod NEG    APTT    Collection Time: 09/12/24 12:42 PM   Result Value Ref Range    APTT 27.2 24.7 - 36.0 sec   Prothrombin Time    Collection Time: 09/12/24 12:42 PM   Result Value Ref Range    PT 12.5 12.0 - 14.6 sec    INR 0.93 0.87 - 1.13   LDH    Collection Time: 09/12/24 12:42 PM   Result Value Ref Range    LDH Total 231 107 - 266 U/L   ESTIMATED GFR    Collection Time: 09/12/24 12:42 PM   Result Value Ref Range    GFR (CKD-EPI) 120 >60 mL/min/1.73 m 2   PROTEIN/CREAT RATIO URINE    Collection Time: 09/12/24  1:39 PM   Result Value Ref Range    Total Protein, Urine 139.0 (H) 0.0 - 15.0 mg/dL    Creatinine, Random Urine 89.50 mg/dL    Protein Creatinine Ratio 1553 (H) 10 - 107 mg/g   URINALYSIS    Collection Time: 09/12/24  4:48 PM    Specimen: Urine, Cath   Result Value Ref Range    Color Orange (A)     Character Cloudy (A)     Specific Gravity 1.018 <1.035    Ph 6.0 5.0 - 8.0    Glucose Negative Negative mg/dL    Ketones Trace (A) Negative mg/dL    Protein 100 (A) Negative mg/dL    Bilirubin Negative Negative    Urobilinogen, Urine 0.2 Negative    Nitrite Negative Negative    Leukocyte Esterase Small (A) Negative    Occult Blood Large (A)  Negative    Micro Urine Req Microscopic    URINE MICROSCOPIC (W/UA)    Collection Time: 09/12/24  4:48 PM   Result Value Ref Range    WBC 10-20 (A) /hpf    RBC  (A) /hpf    Bacteria Negative None /hpf    Epithelial Cells Few /hpf    Epithelial Cells Renal Moderate (A) /hpf    Hyaline Cast 0-2 /lpf     9/12/2024 1:01 PM     HISTORY/REASON FOR EXAM:  Pain     TECHNIQUE/EXAM DESCRIPTION:  Renal ultrasound.     COMPARISON: None     FINDINGS:     The right kidney measures 10.76 cm.  The right kidney appears normal in contour and parenchymal echotexture. The corticomedullary differentiation is preserved. The right renal collecting system is not dilated. No hydronephrosis. There are no renal   calculi.     The left kidney measures 10.89 cm. The left kidney appears normal in contour and parenchymal echotexture. The corticomedullary differentiation is preserved. The left renal collecting system is not dilated. No hydronephrosis. There are no renal calculi.     The bladder demonstrates no focal wall abnormality.     Gravid uterus with a fetus in vertex presentation.     IMPRESSION:     1.  Normal renal ultrasound. No hydronephrosis.  2.  Gravid uterus with a fetus in vertex presentation, not otherwise evaluated.  3.  The bladder is unremarkable.    Assessment/Plan:   39w3d/Gestational htn- pt without PIH sx. CPM.  Hematuria/proteinuria-Pt s/p Rocephin 1 gram IV and Pyridium. Pt with continued painful urination. Pt afebrile. No evidence of pyelonephritis.Continue present management. Will consult Urologist after delivery.   Fetal status-reassuring  GBBS negative        Joann Mehta M.D.

## 2024-09-13 NOTE — PROGRESS NOTES
Labor Progress Note    Renee Cheatham   39w2d/hematuria/gestational htn         Subjective:  Pt with complaint of painful urination.  Uterine contractions:yes  Pain: No    Objective:   Vitals:    09/12/24 1139 09/12/24 1144 09/12/24 1341 09/12/24 1547   BP: 123/79 123/79 128/76 135/86   Pulse: 86 86 83 97   Resp:  16  16   Temp:  36.3 °C (97.3 °F)  36.3 °C (97.3 °F)   TempSrc:  Oral  Oral   SpO2:  96%     Weight: 70.3 kg (155 lb)      Height: 1.524 m (5')        FHT: 140's cat 1  [place]: irregular  SVE:FT/Th/high    Membranes ruptured: .no    Meds:   Epidural : .no  Cytotec 25 mcg given by me for buccal administration at 17:00    Labs:  Recent Results (from the past 24 hour(s))   CBC WITH DIFFERENTIAL    Collection Time: 09/12/24 12:42 PM   Result Value Ref Range    WBC 6.5 4.8 - 10.8 K/uL    RBC 4.40 4.20 - 5.40 M/uL    Hemoglobin 13.1 12.0 - 16.0 g/dL    Hematocrit 40.3 37.0 - 47.0 %    MCV 91.6 81.4 - 97.8 fL    MCH 29.8 27.0 - 33.0 pg    MCHC 32.5 32.2 - 35.5 g/dL    RDW 53.9 (H) 35.9 - 50.0 fL    Platelet Count 254 164 - 446 K/uL    MPV 10.9 9.0 - 12.9 fL    Neutrophils-Polys 69.20 44.00 - 72.00 %    Lymphocytes 24.50 22.00 - 41.00 %    Monocytes 4.70 0.00 - 13.40 %    Eosinophils 0.50 0.00 - 6.90 %    Basophils 0.50 0.00 - 1.80 %    Immature Granulocytes 0.60 0.00 - 0.90 %    Nucleated RBC 0.00 0.00 - 0.20 /100 WBC    Neutrophils (Absolute) 4.52 1.82 - 7.42 K/uL    Lymphs (Absolute) 1.60 1.00 - 4.80 K/uL    Monos (Absolute) 0.31 0.00 - 0.85 K/uL    Eos (Absolute) 0.03 0.00 - 0.51 K/uL    Baso (Absolute) 0.03 0.00 - 0.12 K/uL    Immature Granulocytes (abs) 0.04 0.00 - 0.11 K/uL    NRBC (Absolute) 0.00 K/uL   Comp Metabolic Panel    Collection Time: 09/12/24 12:42 PM   Result Value Ref Range    Sodium 135 135 - 145 mmol/L    Potassium 4.3 3.6 - 5.5 mmol/L    Chloride 102 96 - 112 mmol/L    Co2 18 (L) 20 - 33 mmol/L    Anion Gap 15.0 7.0 - 16.0    Glucose 74 65 - 99 mg/dL    Bun 13 8 - 22 mg/dL     Creatinine 0.60 0.50 - 1.40 mg/dL    Calcium 8.9 8.5 - 10.5 mg/dL    Correct Calcium 9.3 8.5 - 10.5 mg/dL    AST(SGOT) 19 12 - 45 U/L    ALT(SGPT) 12 2 - 50 U/L    Alkaline Phosphatase 187 (H) 30 - 99 U/L    Total Bilirubin 0.2 0.1 - 1.5 mg/dL    Albumin 3.5 3.2 - 4.9 g/dL    Total Protein 7.1 6.0 - 8.2 g/dL    Globulin 3.6 (H) 1.9 - 3.5 g/dL    A-G Ratio 1.0 g/dL   URIC ACID    Collection Time: 09/12/24 12:42 PM   Result Value Ref Range    Uric Acid 6.3 1.9 - 8.2 mg/dL   T.PALLIDUM AB AJ (Syphilis)    Collection Time: 09/12/24 12:42 PM   Result Value Ref Range    Syphilis, Treponemal Qual Nonreactive Non-Reactive   COD - Adult (Type and Screen)    Collection Time: 09/12/24 12:42 PM   Result Value Ref Range    ABO Grouping Only A     Rh Grouping Only POS     Antibody Screen-Cod NEG    APTT    Collection Time: 09/12/24 12:42 PM   Result Value Ref Range    APTT 27.2 24.7 - 36.0 sec   Prothrombin Time    Collection Time: 09/12/24 12:42 PM   Result Value Ref Range    PT 12.5 12.0 - 14.6 sec    INR 0.93 0.87 - 1.13   LDH    Collection Time: 09/12/24 12:42 PM   Result Value Ref Range    LDH Total 231 107 - 266 U/L   ESTIMATED GFR    Collection Time: 09/12/24 12:42 PM   Result Value Ref Range    GFR (CKD-EPI) 120 >60 mL/min/1.73 m 2   PROTEIN/CREAT RATIO URINE    Collection Time: 09/12/24  1:39 PM   Result Value Ref Range    Total Protein, Urine 139.0 (H) 0.0 - 15.0 mg/dL    Creatinine, Random Urine 89.50 mg/dL    Protein Creatinine Ratio 1553 (H) 10 - 107 mg/g   URINALYSIS    Collection Time: 09/12/24  4:48 PM    Specimen: Urine, Cath   Result Value Ref Range    Color Orange (A)     Character Cloudy (A)     Specific Gravity 1.018 <1.035    Ph 6.0 5.0 - 8.0    Glucose Negative Negative mg/dL    Ketones Trace (A) Negative mg/dL    Protein 100 (A) Negative mg/dL    Bilirubin Negative Negative    Urobilinogen, Urine 0.2 Negative    Nitrite Negative Negative    Leukocyte Esterase Small (A) Negative    Occult Blood Large (A)  Negative    Micro Urine Req Microscopic    URINE MICROSCOPIC (W/UA)    Collection Time: 09/12/24  4:48 PM   Result Value Ref Range    WBC 10-20 (A) /hpf    RBC  (A) /hpf    Bacteria Negative None /hpf    Epithelial Cells Few /hpf    Epithelial Cells Renal Moderate (A) /hpf    Hyaline Cast 0-2 /lpf     9/12/2024 1:01 PM     HISTORY/REASON FOR EXAM:  Pain     TECHNIQUE/EXAM DESCRIPTION:  Renal ultrasound.     COMPARISON: None     FINDINGS:     The right kidney measures 10.76 cm.  The right kidney appears normal in contour and parenchymal echotexture. The corticomedullary differentiation is preserved. The right renal collecting system is not dilated. No hydronephrosis. There are no renal   calculi.     The left kidney measures 10.89 cm. The left kidney appears normal in contour and parenchymal echotexture. The corticomedullary differentiation is preserved. The left renal collecting system is not dilated. No hydronephrosis. There are no renal calculi.     The bladder demonstrates no focal wall abnormality.     Gravid uterus with a fetus in vertex presentation.     IMPRESSION:     1.  Normal renal ultrasound. No hydronephrosis.  2.  Gravid uterus with a fetus in vertex presentation, not otherwise evaluated.  3.  The bladder is unremarkable.     Assessment/Plan:   1-39w2d/hematuria/gestational proteinuria-Pt has been admitted. PIH labs are normal except for elevated protein creatinine ratio which could be secondary to hematuria. Pt with negative renal u/s for kidney stones. Pt's urine culture was negative from 2 days ago. Pt's labs including CBC, LDH and coags are normal.Will proceed with IOL. Pt s/p one dose of  buccal cytotec. Will order 2nd dose now. Will consult Urologist postpartum if hematuria continues. Pt and spouse agree with the plan and all questions answered.   2. Dysuria-Start Rocephin 1 gram IV and Pyridium 200 mg one tab TID.  2-Fetal status-reassuring      Joann Mehta M.D.

## 2024-09-13 NOTE — PROGRESS NOTES
Labor Progress Note    Renee Cheatham     39w2d/hematuria/gestational htn        Subjective:  Pt getting more uncomfortable with contractions.  Uterine contractions:yes  Pain: Yes. Severity: moderate    Objective:   Vitals:    09/13/24 0000 09/13/24 0200 09/13/24 0400 09/13/24 0600   BP: 120/73 133/77 (!) 142/75 (!) 140/65   Pulse: 85 71 80 82   Resp:       Temp: 36.6 °C (97.8 °F)  36.7 °C (98 °F)    TempSrc: Temporal  Temporal    SpO2:       Weight:       Height:         FHT: 140's cat 1  [place]: q 2  SVE:2/75/-2, per nurse    Membranes ruptured: .no    Meds:   Epidural : .no  Pitocin: .no, s/p 3 doses of cytotec 25 mcg q 4 hours.     Labs:  Recent Results (from the past 24 hour(s))   CBC WITH DIFFERENTIAL    Collection Time: 09/12/24 12:42 PM   Result Value Ref Range    WBC 6.5 4.8 - 10.8 K/uL    RBC 4.40 4.20 - 5.40 M/uL    Hemoglobin 13.1 12.0 - 16.0 g/dL    Hematocrit 40.3 37.0 - 47.0 %    MCV 91.6 81.4 - 97.8 fL    MCH 29.8 27.0 - 33.0 pg    MCHC 32.5 32.2 - 35.5 g/dL    RDW 53.9 (H) 35.9 - 50.0 fL    Platelet Count 254 164 - 446 K/uL    MPV 10.9 9.0 - 12.9 fL    Neutrophils-Polys 69.20 44.00 - 72.00 %    Lymphocytes 24.50 22.00 - 41.00 %    Monocytes 4.70 0.00 - 13.40 %    Eosinophils 0.50 0.00 - 6.90 %    Basophils 0.50 0.00 - 1.80 %    Immature Granulocytes 0.60 0.00 - 0.90 %    Nucleated RBC 0.00 0.00 - 0.20 /100 WBC    Neutrophils (Absolute) 4.52 1.82 - 7.42 K/uL    Lymphs (Absolute) 1.60 1.00 - 4.80 K/uL    Monos (Absolute) 0.31 0.00 - 0.85 K/uL    Eos (Absolute) 0.03 0.00 - 0.51 K/uL    Baso (Absolute) 0.03 0.00 - 0.12 K/uL    Immature Granulocytes (abs) 0.04 0.00 - 0.11 K/uL    NRBC (Absolute) 0.00 K/uL   Comp Metabolic Panel    Collection Time: 09/12/24 12:42 PM   Result Value Ref Range    Sodium 135 135 - 145 mmol/L    Potassium 4.3 3.6 - 5.5 mmol/L    Chloride 102 96 - 112 mmol/L    Co2 18 (L) 20 - 33 mmol/L    Anion Gap 15.0 7.0 - 16.0    Glucose 74 65 - 99 mg/dL    Bun 13 8 - 22 mg/dL     Creatinine 0.60 0.50 - 1.40 mg/dL    Calcium 8.9 8.5 - 10.5 mg/dL    Correct Calcium 9.3 8.5 - 10.5 mg/dL    AST(SGOT) 19 12 - 45 U/L    ALT(SGPT) 12 2 - 50 U/L    Alkaline Phosphatase 187 (H) 30 - 99 U/L    Total Bilirubin 0.2 0.1 - 1.5 mg/dL    Albumin 3.5 3.2 - 4.9 g/dL    Total Protein 7.1 6.0 - 8.2 g/dL    Globulin 3.6 (H) 1.9 - 3.5 g/dL    A-G Ratio 1.0 g/dL   URIC ACID    Collection Time: 09/12/24 12:42 PM   Result Value Ref Range    Uric Acid 6.3 1.9 - 8.2 mg/dL   T.PALLIDUM AB AJ (Syphilis)    Collection Time: 09/12/24 12:42 PM   Result Value Ref Range    Syphilis, Treponemal Qual Nonreactive Non-Reactive   COD - Adult (Type and Screen)    Collection Time: 09/12/24 12:42 PM   Result Value Ref Range    ABO Grouping Only A     Rh Grouping Only POS     Antibody Screen-Cod NEG    APTT    Collection Time: 09/12/24 12:42 PM   Result Value Ref Range    APTT 27.2 24.7 - 36.0 sec   Prothrombin Time    Collection Time: 09/12/24 12:42 PM   Result Value Ref Range    PT 12.5 12.0 - 14.6 sec    INR 0.93 0.87 - 1.13   LDH    Collection Time: 09/12/24 12:42 PM   Result Value Ref Range    LDH Total 231 107 - 266 U/L   ESTIMATED GFR    Collection Time: 09/12/24 12:42 PM   Result Value Ref Range    GFR (CKD-EPI) 120 >60 mL/min/1.73 m 2   PROTEIN/CREAT RATIO URINE    Collection Time: 09/12/24  1:39 PM   Result Value Ref Range    Total Protein, Urine 139.0 (H) 0.0 - 15.0 mg/dL    Creatinine, Random Urine 89.50 mg/dL    Protein Creatinine Ratio 1553 (H) 10 - 107 mg/g   URINALYSIS    Collection Time: 09/12/24  4:48 PM    Specimen: Urine, Cath   Result Value Ref Range    Color Orange (A)     Character Cloudy (A)     Specific Gravity 1.018 <1.035    Ph 6.0 5.0 - 8.0    Glucose Negative Negative mg/dL    Ketones Trace (A) Negative mg/dL    Protein 100 (A) Negative mg/dL    Bilirubin Negative Negative    Urobilinogen, Urine 0.2 Negative    Nitrite Negative Negative    Leukocyte Esterase Small (A) Negative    Occult Blood Large (A)  Negative    Micro Urine Req Microscopic    URINE MICROSCOPIC (W/UA)    Collection Time: 09/12/24  4:48 PM   Result Value Ref Range    WBC 10-20 (A) /hpf    RBC  (A) /hpf    Bacteria Negative None /hpf    Epithelial Cells Few /hpf    Epithelial Cells Renal Moderate (A) /hpf    Hyaline Cast 0-2 /lpf       Assessment:   39w3d/IOL for gestational HTN-pt with stable labs and blood pressures. Pt had 3 doses of cytotec and now 2 cm. Will start Pitocin. Pt may have an epidural.  Hematuria-Pt on Rocephin 1 gram IV and pyridium. Will consult Urology after delivery if hematuria persist.   Pt with continued hematuria but now has bloody show therefore unsure if it's worsened from bloody show.  GBBS negative  Fetal status-reassuring        Joann Mehta M.D.

## 2024-09-13 NOTE — ANESTHESIA PROCEDURE NOTES
Epidural Block    Date/Time: 9/13/2024 11:04 AM    Performed by: Nolberto Hua M.D.  Authorized by: Nolberto Hua M.D.    Patient Location:  [facility]  Start Time:  9/13/2024 11:04 AM  End Time:  9/13/2024 11:09 AM  Reason for Block: labor analgesia    patient identified, IV checked, site marked, risks and benefits discussed, surgical consent, monitors and equipment checked and pre-op evaluation    Patient Position:  Sitting  Prep: ChloraPrep, patient draped and sterile technique    Monitoring:  Blood pressure, continuous pulse oximetry and heart rate  Approach:  Midline  Location:  L2-L3  Injection Technique:  WALLACE saline  Skin infiltration:  Lidocaine  Strength:  1%  Dose:  3ml  Needle Type:  Tuohy  Needle Gauge:  17 G  Needle Length:  3.5 in  Loss of resistance::  3.5  Catheter Size:  19 G  Catheter at Skin Depth:  10  Test Dose Result:  Negative   Success on 1st pass  No heme/CSF  Catheter threaded easily  Negative aspiration  No evidence of complications  Patient comfortable after loading dose

## 2024-09-13 NOTE — PROGRESS NOTES
Labor Progress Note    Renee Cheatham     39w2d/hematuria/gestational htn     Subjective:  Pt hurting with contractions. Pt is considering an epidural.  Uterine contractions:yes  Pain: Yes. Severity: moderate    Objective:   Vitals:    09/13/24 0000 09/13/24 0200 09/13/24 0400 09/13/24 0600   BP: 120/73 133/77 (!) 142/75 (!) 140/65   Pulse: 85 71 80 82   Resp:       Temp: 36.6 °C (97.8 °F)  36.7 °C (98 °F)    TempSrc: Temporal  Temporal    SpO2:       Weight:       Height:         FHT: 140's cat 1  [place]: q 2  SVE:3/75/-2, arom, clear, IUPC placed      Membranes ruptured: .yes, clear    Meds:   Epidural : .no  Pitocin: .yes, 2 mu  Pt s/p 3 doses of cytotec 25 mcg 4 hours apart.     Labs:  Recent Results (from the past 24 hour(s))   CBC WITH DIFFERENTIAL    Collection Time: 09/12/24 12:42 PM   Result Value Ref Range    WBC 6.5 4.8 - 10.8 K/uL    RBC 4.40 4.20 - 5.40 M/uL    Hemoglobin 13.1 12.0 - 16.0 g/dL    Hematocrit 40.3 37.0 - 47.0 %    MCV 91.6 81.4 - 97.8 fL    MCH 29.8 27.0 - 33.0 pg    MCHC 32.5 32.2 - 35.5 g/dL    RDW 53.9 (H) 35.9 - 50.0 fL    Platelet Count 254 164 - 446 K/uL    MPV 10.9 9.0 - 12.9 fL    Neutrophils-Polys 69.20 44.00 - 72.00 %    Lymphocytes 24.50 22.00 - 41.00 %    Monocytes 4.70 0.00 - 13.40 %    Eosinophils 0.50 0.00 - 6.90 %    Basophils 0.50 0.00 - 1.80 %    Immature Granulocytes 0.60 0.00 - 0.90 %    Nucleated RBC 0.00 0.00 - 0.20 /100 WBC    Neutrophils (Absolute) 4.52 1.82 - 7.42 K/uL    Lymphs (Absolute) 1.60 1.00 - 4.80 K/uL    Monos (Absolute) 0.31 0.00 - 0.85 K/uL    Eos (Absolute) 0.03 0.00 - 0.51 K/uL    Baso (Absolute) 0.03 0.00 - 0.12 K/uL    Immature Granulocytes (abs) 0.04 0.00 - 0.11 K/uL    NRBC (Absolute) 0.00 K/uL   Comp Metabolic Panel    Collection Time: 09/12/24 12:42 PM   Result Value Ref Range    Sodium 135 135 - 145 mmol/L    Potassium 4.3 3.6 - 5.5 mmol/L    Chloride 102 96 - 112 mmol/L    Co2 18 (L) 20 - 33 mmol/L    Anion Gap 15.0 7.0 - 16.0     Glucose 74 65 - 99 mg/dL    Bun 13 8 - 22 mg/dL    Creatinine 0.60 0.50 - 1.40 mg/dL    Calcium 8.9 8.5 - 10.5 mg/dL    Correct Calcium 9.3 8.5 - 10.5 mg/dL    AST(SGOT) 19 12 - 45 U/L    ALT(SGPT) 12 2 - 50 U/L    Alkaline Phosphatase 187 (H) 30 - 99 U/L    Total Bilirubin 0.2 0.1 - 1.5 mg/dL    Albumin 3.5 3.2 - 4.9 g/dL    Total Protein 7.1 6.0 - 8.2 g/dL    Globulin 3.6 (H) 1.9 - 3.5 g/dL    A-G Ratio 1.0 g/dL   URIC ACID    Collection Time: 09/12/24 12:42 PM   Result Value Ref Range    Uric Acid 6.3 1.9 - 8.2 mg/dL   T.PALLIDUM AB AJ (Syphilis)    Collection Time: 09/12/24 12:42 PM   Result Value Ref Range    Syphilis, Treponemal Qual Nonreactive Non-Reactive   COD - Adult (Type and Screen)    Collection Time: 09/12/24 12:42 PM   Result Value Ref Range    ABO Grouping Only A     Rh Grouping Only POS     Antibody Screen-Cod NEG    APTT    Collection Time: 09/12/24 12:42 PM   Result Value Ref Range    APTT 27.2 24.7 - 36.0 sec   Prothrombin Time    Collection Time: 09/12/24 12:42 PM   Result Value Ref Range    PT 12.5 12.0 - 14.6 sec    INR 0.93 0.87 - 1.13   LDH    Collection Time: 09/12/24 12:42 PM   Result Value Ref Range    LDH Total 231 107 - 266 U/L   ESTIMATED GFR    Collection Time: 09/12/24 12:42 PM   Result Value Ref Range    GFR (CKD-EPI) 120 >60 mL/min/1.73 m 2   PROTEIN/CREAT RATIO URINE    Collection Time: 09/12/24  1:39 PM   Result Value Ref Range    Total Protein, Urine 139.0 (H) 0.0 - 15.0 mg/dL    Creatinine, Random Urine 89.50 mg/dL    Protein Creatinine Ratio 1553 (H) 10 - 107 mg/g   URINALYSIS    Collection Time: 09/12/24  4:48 PM    Specimen: Urine, Cath   Result Value Ref Range    Color Orange (A)     Character Cloudy (A)     Specific Gravity 1.018 <1.035    Ph 6.0 5.0 - 8.0    Glucose Negative Negative mg/dL    Ketones Trace (A) Negative mg/dL    Protein 100 (A) Negative mg/dL    Bilirubin Negative Negative    Urobilinogen, Urine 0.2 Negative    Nitrite Negative Negative    Leukocyte  Esterase Small (A) Negative    Occult Blood Large (A) Negative    Micro Urine Req Microscopic    URINE MICROSCOPIC (W/UA)    Collection Time: 09/12/24  4:48 PM   Result Value Ref Range    WBC 10-20 (A) /hpf    RBC  (A) /hpf    Bacteria Negative None /hpf    Epithelial Cells Few /hpf    Epithelial Cells Renal Moderate (A) /hpf    Hyaline Cast 0-2 /lpf   9/12/2024 1:01 PM     HISTORY/REASON FOR EXAM:  Pain     TECHNIQUE/EXAM DESCRIPTION:  Renal ultrasound.     COMPARISON: None     FINDINGS:     The right kidney measures 10.76 cm.  The right kidney appears normal in contour and parenchymal echotexture. The corticomedullary differentiation is preserved. The right renal collecting system is not dilated. No hydronephrosis. There are no renal   calculi.     The left kidney measures 10.89 cm. The left kidney appears normal in contour and parenchymal echotexture. The corticomedullary differentiation is preserved. The left renal collecting system is not dilated. No hydronephrosis. There are no renal calculi.     The bladder demonstrates no focal wall abnormality.     Gravid uterus with a fetus in vertex presentation.     IMPRESSION:     1.  Normal renal ultrasound. No hydronephrosis.  2.  Gravid uterus with a fetus in vertex presentation, not otherwise evaluated.  3.  The bladder is unremarkable.    Assessment:   39w3d/IOL for gestational HTN-pt with stable labs and blood pressures. Pt had 3 doses of cytotec and now 2 cm. Will start Pitocin. Pt may have an epidural.  Hematuria-Pt on Rocephin 1 gram IV and pyridium. Will consult Urology after delivery if hematuria persist.   Pt with continued hematuria but now has bloody show therefore unsure if it's worsened from bloody show. Pt had a neg renal ultrasound. Normal coags, platelets and LDH.   GBBS negative  Fetal status-reassuring    Joann Mehta M.D.

## 2024-09-13 NOTE — PROGRESS NOTES
1430 Report received from Saray PEREA.    1507 Gemma ADRIAN updated on pt status. Order received to restart pitocin (See MAR).    1525 Gemma ADRIAN called to review fetal tracing and SVE update. Order received to keep pitocin off. POC discussed with pt.     1938 Gemma ADRIAN at bedside.    2033 SVE unchanged. Gemma Adrian updated on pt status. Order received to restart pitocin (See MAR).    2140 Prolonged deceleration noted. Gemma ADRIAN updated. MD to come to bedside for evaluation.    2148 Gemma ADRIAN at bedside. SVE unchanged, and now cervix swollen with fetal caput. C/S called. Pt agreeable to plan.    2222 Pt into OR2.    2243 Delivery of viable male. APGARs 8/9. EBL 1200mL.    2331 Pt into PACU1 in stable condition.    0036 Pt up to PP in stable condition. Report given to Gemma PEREA.

## 2024-09-13 NOTE — H&P
DATE OF ADMISSION:  2024     ADMITTING DIAGNOSES:  1.  Intrauterine pregnancy at 39 weeks and 2 days.  2.  Hematuria.  3.  Gestational hypertension based on labile blood pressures and proteinuria.  4.  Advanced maternal age, had negative NIPT and negative MSAFP and normal   ultrasound with MFM.  5.  Group B Strep negative.     HISTORY OF PRESENT ILLNESS:  This patient is a 34-year-old  1, para 0   at 39 weeks and 2 days with a due date of 2024 based on last menstrual   cycle of 2023 and a 10-week ultrasound.  The patient has had prenatal   care with me.  She was referred to high-risk pregnancy center for advanced   maternal age, has had normal ultrasounds and had negative NIPT and negative   MSAFP.  The patient was seen in my office on 2024 at 38 weeks and 6   days, complaining of painful urination.  She was found to have 2+ protein in   the urine and 3+ blood with a blood pressure of 149/101.  The patient had no   fevers and negative CVA tenderness.  She was started on Macrobid 100 mg 1 p.o.   b.i.d. and Pyridium and was sent to labor and delivery on 2024 to rule   out preeclampsia.  Her initial blood pressures were normal and at that time   her PIH labs were normal except that it was noted that she had hematuria,   bloody obvious urine.  Her creatinine was 0.61.  Normal CBC with platelets of   262,000.  Protein creatinine ratio was 1,069, but the urine did show large   blood and protein 30 mg/dL on the urine. The patient had no PIH symptoms and   her blood pressures were normal. Therefore, she was sent home and instructed   to come to see me today.  The patient returned to the office today complaining   still of painful urination, voided urine again was obviously bloody and her   blood pressure was found to be 144/96.  No preeclampsia symptoms.  She had a   3+ blood in the urine.  The decision was made to send her to labor and   delivery for induction of labor for hematuria and  gestational hypertension   based on labile blood pressures and proteinuria.  [facility] labs were repeated.    Again, her platelets are normal at 254,000.  Her creatinine is normal at 0.60.   AST and ALT are normal, 19 and 12.  LDH normal at 231.  Protein creatinine   ratio 1,553 mg/dL. Another urinalysis was sent. Her uric acid was found to be   6.3.  The patient is having good fetal movement and no contractions. The   urinalysis from 2024 was negative, no growth at 48 hours.  Repeat   urinalysis was sent today.  The patient is now being induced.  She was on   Pitocin for 2 hours and that was discontinued since her cervix is only   fingertip, thick and high and she had Cytotec 25 mcg buccally.     PAST MEDICAL HISTORY:  Endometriosis.     PAST SURGICAL HISTORY:  None.     MEDICATIONS:  Prenatal vitamins.     ALLERGIES:  No known drug allergies.     OBSTETRICAL HISTORY:  She is a  1, para 0.     GYNECOLOGIC HISTORY:  The patient started menstruating at age 12, has   menstrual cycles every 28 days, lasts 5 days.  Her last menstrual cycle was on   2023.  Her last Pap smear on 2024.  Negative Pap, negative   chlamydia, negative gonorrhea, negative HPV.     SOCIAL HISTORY:  The patient is .  Denies tobacco, alcohol or drug use.     PHYSICAL EXAMINATION:  VITAL SIGNS:  Blood pressures are labile 120-140/70-90.  She is afebrile.  GENERAL:  Pleasant female in no acute distress.  LUNGS:  Clear to auscultation bilaterally.  CARDIOVASCULAR:  Regular rate and rhythm.  No murmur.  ABDOMEN:  Soft, gravid.  Leopold's 7 pounds.  EXTREMITIES:  No calf tenderness, 1+ edema, 2+ reflexes.  BACK:  No CVA tenderness.  PELVIC:  Sterile vaginal exam, fingertip, thick and high.     Fetal heart tones 140s, category 1. [place], darleen every 1-2 minutes.     LABORATORY DATA:  Her labs again from today normal.  H and H 13.1 and 40.3 and   platelets are 254,000.  CMP is normal.  AST and ALT normal. Creatinine normal    at 0.6. Uric acid 6.3.  Coags normal with a PTT 27.2, PT 12.5 and LDH at 231.    The rest of her labs are group B Strep negative.  One-hour glucose tolerance   test elevated at 181, 3-hour glucose tolerance test was normal.  Fasting   glucose 89, 1-hour 204, 2-hour 153, 3-hour 115.  RPR was negative.  Carrier   screen was negative for 14 out of the 14 disease.  MSAFP was negative.  NIPT   was negative. HIV nonreactive, RPR nonreactive, rubella immune, hepatitis B   surface antigen nonreactive.  She is A positive, antibody screen negative.     DIAGNOSTIC DATA:  A renal ultrasound was performed and it shows a normal renal   ultrasound, no hydronephrosis.  Gravid uterus with fetus in vertex   presentation.  Unremarkable bladder.  [facility] ultrasound, most current ultrasound   was on 2024, fetus was measuring 32 weeks and 1 day, 4 pounds 2 ounces,   vertex presentation, fetal growth appeared normal.  Anterior placenta, no   placenta previa.     ASSESSMENT AND PLAN:  A 34-year-old  1, para 0 at 39 weeks and 2 days.  1.  Hematuria.  The patient had urinalysis that was sent for culture and   sensitivity 2 days ago and the cultures negative at 48 hours.  A repeat   urinalysis was ordered today.  The patient is afebrile with negative CVA   tenderness.  She does complain of painful urination.  2.  Gestational hypertension based on labile blood pressures and proteinuria.   Repeat urinalysis was ordered today.  She did have a renal ultrasound that was   negative.  No stones were visualized.  She has normal platelets and normal   LDH and normal coags. We will obtain a urology consult once the patient   delivers. If the urinalysis is positive for evidence of UTI, we will start the   patient on Rocephin, but so far the culture from 2 days ago was negative.    The patient with labile blood pressures 140s/90s and proteinuria.  The   proteinuria could be from the hematuria.  At this time, we will continue to   monitor.  We  will start antihypertensive protocol if her blood pressures are   greater than 160/110 and at that time we will consider magnesium sulfate.  Her   PIH labs are otherwise normal.  3.  GBS negative.  4.  Advanced maternal age. Had a normal comprehensive ultrasound with MFM,   negative NIPT and negative MSAFP.  5.  History of endometriosis with a known endometrioma.  6.  Fetal status is reassuring.        ______________________________  TU VARNER MD    [facility]/LINDA    DD:  09/12/2024 19:22  DT:  09/12/2024 20:00    Job#:  [id number]    CC:Caprice Reynolds MD

## 2024-09-13 NOTE — ANESTHESIA PREPROCEDURE EVALUATION
Date: 24  Procedure: Labor Epidural        alicea pregnancy at 39 weeks gestation.    Relevant Problems   No relevant active problems       Physical Exam    Airway   Mallampati: II  TM distance: >3 FB  Neck ROM: full       Cardiovascular - normal exam  Rhythm: regular  Rate: normal  (-) murmur     Dental - normal exam           Pulmonary - normal exam  Breath sounds clear to auscultation     Abdominal    Neurological - normal exam                   Anesthesia Plan    ASA 2       Plan - epidural   Neuraxial block will be labor analgesia                  Pertinent diagnostic labs and testing reviewed    Informed Consent:    Anesthetic plan and risks discussed with patient.

## 2024-09-13 NOTE — PROGRESS NOTES
1900-Rcvd report from dayshift RN and assumed care of pt.  2130-Dr. Mehta at bedside. SVE=FT/thick  Will place 2nd dose of cytotec. Abx ordered.  2315-Pt states she still has pain with urination but urine is now yellow with no blood.  0320-Dr. Mehta called and updated on pt. Will come to bedside. SVE=unchanged.  0330-3rd dose of cytotec given.  0700-report given to dayshift RN

## 2024-09-13 NOTE — PROGRESS NOTES
0700- Report received, care assumed.  0730-SVE 2/70/-2. Very posterior, Dr Mehta updated. Orders received to start pitocin  0805-Pitocin started per orders. Pitocin explained to pt, pt denies any questions at this time.   0958- Dr Mehta at bedside. SVE 3/70-2, AROM clear fluid. IUPC placed.   1104-Dr Hua at bedside. Epidural discussed. Pt consents to procedure and denies any questions. Pt positioned for epidural placement   1112-Epidural complete. Pt positioned for comfort.   1120-Jauregui placed. SVE. Dr Menendez updated. Pt repositioned on left side. Pt denies any needs at this time.   1430-Report given to Mackenzie PEREA

## 2024-09-13 NOTE — PROGRESS NOTES
Labor Progress Note    Renee Cheatham   39w2d/hematuria/gestational htn      Subjective:  Pt on pitocin x 2 hours and feeling mild cramps. Pt without PIH sx.  Uterine contractions:yes  Pain: No    Objective:   Vitals:    09/12/24 1139 09/12/24 1144 09/12/24 1341 09/12/24 1547   BP: 123/79 123/79 128/76 135/86   Pulse: 86 86 83 97   Resp:  16  16   Temp:  36.3 °C (97.3 °F)  36.3 °C (97.3 °F)   TempSrc:  Oral  Oral   SpO2:  96%     Weight: 70.3 kg (155 lb)      Height: 1.524 m (5')        FHT: 140's cat 1  [place]: q 1-2  SVE:ft/thick/high     Membranes ruptured: .no    Meds:   Epidural : .no  Pitocin: .yes, 6 mu and now off  Cytotec 25 mcg given by me for buccal administration    Labs:  Recent Results (from the past 24 hour(s))   CBC WITH DIFFERENTIAL    Collection Time: 09/12/24 12:42 PM   Result Value Ref Range    WBC 6.5 4.8 - 10.8 K/uL    RBC 4.40 4.20 - 5.40 M/uL    Hemoglobin 13.1 12.0 - 16.0 g/dL    Hematocrit 40.3 37.0 - 47.0 %    MCV 91.6 81.4 - 97.8 fL    MCH 29.8 27.0 - 33.0 pg    MCHC 32.5 32.2 - 35.5 g/dL    RDW 53.9 (H) 35.9 - 50.0 fL    Platelet Count 254 164 - 446 K/uL    MPV 10.9 9.0 - 12.9 fL    Neutrophils-Polys 69.20 44.00 - 72.00 %    Lymphocytes 24.50 22.00 - 41.00 %    Monocytes 4.70 0.00 - 13.40 %    Eosinophils 0.50 0.00 - 6.90 %    Basophils 0.50 0.00 - 1.80 %    Immature Granulocytes 0.60 0.00 - 0.90 %    Nucleated RBC 0.00 0.00 - 0.20 /100 WBC    Neutrophils (Absolute) 4.52 1.82 - 7.42 K/uL    Lymphs (Absolute) 1.60 1.00 - 4.80 K/uL    Monos (Absolute) 0.31 0.00 - 0.85 K/uL    Eos (Absolute) 0.03 0.00 - 0.51 K/uL    Baso (Absolute) 0.03 0.00 - 0.12 K/uL    Immature Granulocytes (abs) 0.04 0.00 - 0.11 K/uL    NRBC (Absolute) 0.00 K/uL   Comp Metabolic Panel    Collection Time: 09/12/24 12:42 PM   Result Value Ref Range    Sodium 135 135 - 145 mmol/L    Potassium 4.3 3.6 - 5.5 mmol/L    Chloride 102 96 - 112 mmol/L    Co2 18 (L) 20 - 33 mmol/L    Anion Gap 15.0 7.0 - 16.0    Glucose 74  65 - 99 mg/dL    Bun 13 8 - 22 mg/dL    Creatinine 0.60 0.50 - 1.40 mg/dL    Calcium 8.9 8.5 - 10.5 mg/dL    Correct Calcium 9.3 8.5 - 10.5 mg/dL    AST(SGOT) 19 12 - 45 U/L    ALT(SGPT) 12 2 - 50 U/L    Alkaline Phosphatase 187 (H) 30 - 99 U/L    Total Bilirubin 0.2 0.1 - 1.5 mg/dL    Albumin 3.5 3.2 - 4.9 g/dL    Total Protein 7.1 6.0 - 8.2 g/dL    Globulin 3.6 (H) 1.9 - 3.5 g/dL    A-G Ratio 1.0 g/dL   URIC ACID    Collection Time: 09/12/24 12:42 PM   Result Value Ref Range    Uric Acid 6.3 1.9 - 8.2 mg/dL   T.PALLIDUM AB AJ (Syphilis)    Collection Time: 09/12/24 12:42 PM   Result Value Ref Range    Syphilis, Treponemal Qual Nonreactive Non-Reactive   COD - Adult (Type and Screen)    Collection Time: 09/12/24 12:42 PM   Result Value Ref Range    ABO Grouping Only A     Rh Grouping Only POS     Antibody Screen-Cod NEG    APTT    Collection Time: 09/12/24 12:42 PM   Result Value Ref Range    APTT 27.2 24.7 - 36.0 sec   Prothrombin Time    Collection Time: 09/12/24 12:42 PM   Result Value Ref Range    PT 12.5 12.0 - 14.6 sec    INR 0.93 0.87 - 1.13   LDH    Collection Time: 09/12/24 12:42 PM   Result Value Ref Range    LDH Total 231 107 - 266 U/L   ESTIMATED GFR    Collection Time: 09/12/24 12:42 PM   Result Value Ref Range    GFR (CKD-EPI) 120 >60 mL/min/1.73 m 2   PROTEIN/CREAT RATIO URINE    Collection Time: 09/12/24  1:39 PM   Result Value Ref Range    Total Protein, Urine 139.0 (H) 0.0 - 15.0 mg/dL    Creatinine, Random Urine 89.50 mg/dL    Protein Creatinine Ratio 1553 (H) 10 - 107 mg/g     9/12/2024 1:01 PM     HISTORY/REASON FOR EXAM:  Pain     TECHNIQUE/EXAM DESCRIPTION:  Renal ultrasound.     COMPARISON: None     FINDINGS:     The right kidney measures 10.76 cm.  The right kidney appears normal in contour and parenchymal echotexture. The corticomedullary differentiation is preserved. The right renal collecting system is not dilated. No hydronephrosis. There are no renal   calculi.     The left kidney  measures 10.89 cm. The left kidney appears normal in contour and parenchymal echotexture. The corticomedullary differentiation is preserved. The left renal collecting system is not dilated. No hydronephrosis. There are no renal calculi.     The bladder demonstrates no focal wall abnormality.     Gravid uterus with a fetus in vertex presentation.     IMPRESSION:     1.  Normal renal ultrasound. No hydronephrosis.  2.  Gravid uterus with a fetus in vertex presentation, not otherwise evaluated.  3.  The bladder is unremarkable.    Assessment/Plan:   1-39w2d/hematuria/gestational proteinuria-Pt has been admitted. PIH labs are normal except for elevated protein creatinine ratio which could be secondary to hematuria. Pt with negative renal u/s for kidney stones. Pt's urine culture was negative from 2 days ago. Pt's labs including CBC, LDH and coags are normal.Will proceed with IOL. Pitocin was d/c and buccal cytotec given for unfavorable cervix. Will consult Urologist postpartum if hematuria continues. Pt and spouse agree with the plan and all questions answered.   2-Fetal status-reassuring      Joann Mehta M.D.

## 2024-09-14 ENCOUNTER — APPOINTMENT (OUTPATIENT)
Dept: RADIOLOGY | Facility: MEDICAL CENTER | Age: 34
End: 2024-09-14
Attending: OBSTETRICS & GYNECOLOGY
Payer: COMMERCIAL

## 2024-09-14 LAB
APPEARANCE UR: CLEAR
APTT PPP: 27.9 SEC (ref 24.7–36)
BACTERIA #/AREA URNS HPF: ABNORMAL /HPF
BILIRUB UR QL STRIP.AUTO: NEGATIVE
CFT BLD TEG: 4.2 MIN (ref 4.6–9.1)
CFT P HPASE BLD TEG: 3.7 MIN (ref 4.3–8.3)
CLOT ANGLE BLD TEG: 76.9 DEGREES (ref 63–78)
COLOR UR: ABNORMAL
CT.EXTRINSIC BLD ROTEM: 1 MIN (ref 0.8–2.1)
EPI CELLS #/AREA URNS HPF: ABNORMAL /HPF
ERYTHROCYTE [DISTWIDTH] IN BLOOD BY AUTOMATED COUNT: 53.5 FL (ref 35.9–50)
FIBRINOGEN PPP-MCNC: 403 MG/DL (ref 215–460)
GLUCOSE UR STRIP.AUTO-MCNC: NEGATIVE MG/DL
HCT VFR BLD AUTO: 31.3 % (ref 37–47)
HGB BLD-MCNC: 10.4 G/DL (ref 12–16)
HYALINE CASTS #/AREA URNS LPF: ABNORMAL /LPF
INR PPP: 1.04 (ref 0.87–1.13)
KETONES UR STRIP.AUTO-MCNC: NEGATIVE MG/DL
LEUKOCYTE ESTERASE UR QL STRIP.AUTO: ABNORMAL
MCF BLD TEG: 65 MM (ref 52–69)
MCF.PLATELET INHIB BLD ROTEM: 23.8 MM (ref 15–32)
MCH RBC QN AUTO: 30.1 PG (ref 27–33)
MCHC RBC AUTO-ENTMCNC: 33.2 G/DL (ref 32.2–35.5)
MCV RBC AUTO: 90.7 FL (ref 81.4–97.8)
MICRO URNS: ABNORMAL
NITRITE UR QL STRIP.AUTO: POSITIVE
PA AA BLD-ACNC: 19.2 % (ref 0–11)
PA ADP BLD-ACNC: 6.7 % (ref 0–17)
PH UR STRIP.AUTO: 5.5 [PH] (ref 5–8)
PLATELET # BLD AUTO: 208 K/UL (ref 164–446)
PMV BLD AUTO: 10.1 FL (ref 9–12.9)
PROT UR QL STRIP: NEGATIVE MG/DL
PROTHROMBIN TIME: 13.7 SEC (ref 12–14.6)
RBC # BLD AUTO: 3.45 M/UL (ref 4.2–5.4)
RBC # URNS HPF: ABNORMAL /HPF
RBC UR QL AUTO: ABNORMAL
SP GR UR STRIP.AUTO: 1.02
TEG ALGORITHM TGALG: ABNORMAL
UROBILINOGEN UR STRIP.AUTO-MCNC: 1 MG/DL
WBC # BLD AUTO: 17.8 K/UL (ref 4.8–10.8)
WBC #/AREA URNS HPF: ABNORMAL /HPF

## 2024-09-14 PROCEDURE — 700102 HCHG RX REV CODE 250 W/ 637 OVERRIDE(OP): Performed by: OBSTETRICS & GYNECOLOGY

## 2024-09-14 PROCEDURE — 700102 HCHG RX REV CODE 250 W/ 637 OVERRIDE(OP): Performed by: ANESTHESIOLOGY

## 2024-09-14 PROCEDURE — 770002 HCHG ROOM/CARE - OB PRIVATE (112)

## 2024-09-14 PROCEDURE — 700111 HCHG RX REV CODE 636 W/ 250 OVERRIDE (IP): Performed by: OBSTETRICS & GYNECOLOGY

## 2024-09-14 PROCEDURE — 87086 URINE CULTURE/COLONY COUNT: CPT

## 2024-09-14 PROCEDURE — 700105 HCHG RX REV CODE 258: Performed by: OBSTETRICS & GYNECOLOGY

## 2024-09-14 PROCEDURE — A9270 NON-COVERED ITEM OR SERVICE: HCPCS | Performed by: OBSTETRICS & GYNECOLOGY

## 2024-09-14 PROCEDURE — 700111 HCHG RX REV CODE 636 W/ 250 OVERRIDE (IP): Mod: JZ | Performed by: ANESTHESIOLOGY

## 2024-09-14 PROCEDURE — 87186 SC STD MICRODIL/AGAR DIL: CPT

## 2024-09-14 PROCEDURE — 99221 1ST HOSP IP/OBS SF/LOW 40: CPT | Performed by: STUDENT IN AN ORGANIZED HEALTH CARE EDUCATION/TRAINING PROGRAM

## 2024-09-14 PROCEDURE — 87077 CULTURE AEROBIC IDENTIFY: CPT

## 2024-09-14 PROCEDURE — 74178 CT ABD&PLV WO CNTR FLWD CNTR: CPT

## 2024-09-14 PROCEDURE — 700117 HCHG RX CONTRAST REV CODE 255: Performed by: OBSTETRICS & GYNECOLOGY

## 2024-09-14 PROCEDURE — 85027 COMPLETE CBC AUTOMATED: CPT

## 2024-09-14 PROCEDURE — A9270 NON-COVERED ITEM OR SERVICE: HCPCS | Performed by: ANESTHESIOLOGY

## 2024-09-14 PROCEDURE — 81001 URINALYSIS AUTO W/SCOPE: CPT

## 2024-09-14 PROCEDURE — 36415 COLL VENOUS BLD VENIPUNCTURE: CPT

## 2024-09-14 RX ORDER — CALCIUM CARBONATE 500 MG/1
1000 TABLET, CHEWABLE ORAL EVERY 6 HOURS PRN
Status: DISCONTINUED | OUTPATIENT
Start: 2024-09-14 | End: 2024-09-16 | Stop reason: HOSPADM

## 2024-09-14 RX ORDER — DIPHENHYDRAMINE HYDROCHLORIDE 50 MG/ML
25 INJECTION INTRAMUSCULAR; INTRAVENOUS EVERY 6 HOURS PRN
Status: ACTIVE | OUTPATIENT
Start: 2024-09-14 | End: 2024-09-15

## 2024-09-14 RX ORDER — ACETAMINOPHEN 500 MG
1000 TABLET ORAL EVERY 6 HOURS
Status: DISCONTINUED | OUTPATIENT
Start: 2024-09-15 | End: 2024-09-16 | Stop reason: HOSPADM

## 2024-09-14 RX ORDER — OXYCODONE HYDROCHLORIDE 10 MG/1
10 TABLET ORAL EVERY 4 HOURS PRN
Status: ACTIVE | OUTPATIENT
Start: 2024-09-14 | End: 2024-09-15

## 2024-09-14 RX ORDER — DIPHENHYDRAMINE HYDROCHLORIDE 50 MG/ML
25 INJECTION INTRAMUSCULAR; INTRAVENOUS EVERY 6 HOURS PRN
Status: DISCONTINUED | OUTPATIENT
Start: 2024-09-15 | End: 2024-09-16 | Stop reason: HOSPADM

## 2024-09-14 RX ORDER — SODIUM CHLORIDE, SODIUM LACTATE, POTASSIUM CHLORIDE, CALCIUM CHLORIDE 600; 310; 30; 20 MG/100ML; MG/100ML; MG/100ML; MG/100ML
INJECTION, SOLUTION INTRAVENOUS PRN
Status: DISCONTINUED | OUTPATIENT
Start: 2024-09-14 | End: 2024-09-16 | Stop reason: HOSPADM

## 2024-09-14 RX ORDER — SIMETHICONE 125 MG
125 TABLET,CHEWABLE ORAL 4 TIMES DAILY PRN
Status: DISCONTINUED | OUTPATIENT
Start: 2024-09-14 | End: 2024-09-16 | Stop reason: HOSPADM

## 2024-09-14 RX ORDER — DIPHENHYDRAMINE HYDROCHLORIDE 50 MG/ML
12.5 INJECTION INTRAMUSCULAR; INTRAVENOUS EVERY 6 HOURS PRN
Status: ACTIVE | OUTPATIENT
Start: 2024-09-14 | End: 2024-09-15

## 2024-09-14 RX ORDER — OXYCODONE HYDROCHLORIDE 5 MG/1
5 TABLET ORAL EVERY 4 HOURS PRN
Status: DISCONTINUED | OUTPATIENT
Start: 2024-09-15 | End: 2024-09-16 | Stop reason: HOSPADM

## 2024-09-14 RX ORDER — DIPHENHYDRAMINE HCL 25 MG
25 TABLET ORAL EVERY 6 HOURS PRN
Status: DISCONTINUED | OUTPATIENT
Start: 2024-09-15 | End: 2024-09-16 | Stop reason: HOSPADM

## 2024-09-14 RX ORDER — ACETAMINOPHEN 500 MG
1000 TABLET ORAL EVERY 6 HOURS
Status: COMPLETED | OUTPATIENT
Start: 2024-09-14 | End: 2024-09-14

## 2024-09-14 RX ORDER — OXYCODONE HYDROCHLORIDE 5 MG/1
5 TABLET ORAL EVERY 4 HOURS PRN
Status: ACTIVE | OUTPATIENT
Start: 2024-09-14 | End: 2024-09-15

## 2024-09-14 RX ORDER — DOCUSATE SODIUM 100 MG/1
100 CAPSULE, LIQUID FILLED ORAL 2 TIMES DAILY PRN
Status: DISCONTINUED | OUTPATIENT
Start: 2024-09-14 | End: 2024-09-16 | Stop reason: HOSPADM

## 2024-09-14 RX ORDER — ONDANSETRON 4 MG/1
4 TABLET, ORALLY DISINTEGRATING ORAL EVERY 6 HOURS PRN
Status: DISCONTINUED | OUTPATIENT
Start: 2024-09-15 | End: 2024-09-16 | Stop reason: HOSPADM

## 2024-09-14 RX ORDER — ONDANSETRON 2 MG/ML
4 INJECTION INTRAMUSCULAR; INTRAVENOUS EVERY 6 HOURS PRN
Status: DISCONTINUED | OUTPATIENT
Start: 2024-09-15 | End: 2024-09-16 | Stop reason: HOSPADM

## 2024-09-14 RX ORDER — ONDANSETRON 2 MG/ML
4 INJECTION INTRAMUSCULAR; INTRAVENOUS EVERY 6 HOURS PRN
Status: ACTIVE | OUTPATIENT
Start: 2024-09-14 | End: 2024-09-15

## 2024-09-14 RX ORDER — VITAMIN A ACETATE, BETA CAROTENE, ASCORBIC ACID, CHOLECALCIFEROL, .ALPHA.-TOCOPHEROL ACETATE, DL-, THIAMINE MONONITRATE, RIBOFLAVIN, NIACINAMIDE, PYRIDOXINE HYDROCHLORIDE, FOLIC ACID, CYANOCOBALAMIN, CALCIUM CARBONATE, FERROUS FUMARATE, ZINC OXIDE, CUPRIC OXIDE 3080; 12; 120; 400; 1; 1.84; 3; 20; 22; 920; 25; 200; 27; 10; 2 [IU]/1; UG/1; MG/1; [IU]/1; MG/1; MG/1; MG/1; MG/1; MG/1; [IU]/1; MG/1; MG/1; MG/1; MG/1; MG/1
1 TABLET, FILM COATED ORAL
Status: DISCONTINUED | OUTPATIENT
Start: 2024-09-14 | End: 2024-09-16 | Stop reason: HOSPADM

## 2024-09-14 RX ORDER — NIFEDIPINE 10 MG/1
10 CAPSULE ORAL
Status: DISCONTINUED | OUTPATIENT
Start: 2024-09-14 | End: 2024-09-16 | Stop reason: HOSPADM

## 2024-09-14 RX ORDER — IBUPROFEN 800 MG/1
800 TABLET, FILM COATED ORAL EVERY 8 HOURS PRN
Status: DISCONTINUED | OUTPATIENT
Start: 2024-09-18 | End: 2024-09-16 | Stop reason: HOSPADM

## 2024-09-14 RX ORDER — CEPHALEXIN 500 MG/1
500 CAPSULE ORAL 3 TIMES DAILY
Status: DISCONTINUED | OUTPATIENT
Start: 2024-09-14 | End: 2024-09-16 | Stop reason: HOSPADM

## 2024-09-14 RX ORDER — ACETAMINOPHEN 500 MG
1000 TABLET ORAL EVERY 6 HOURS PRN
Status: DISCONTINUED | OUTPATIENT
Start: 2024-09-18 | End: 2024-09-16 | Stop reason: HOSPADM

## 2024-09-14 RX ORDER — EPHEDRINE SULFATE 50 MG/ML
10 INJECTION, SOLUTION INTRAVENOUS
Status: ACTIVE | OUTPATIENT
Start: 2024-09-14 | End: 2024-09-15

## 2024-09-14 RX ORDER — OXYCODONE HYDROCHLORIDE 10 MG/1
10 TABLET ORAL EVERY 4 HOURS PRN
Status: DISCONTINUED | OUTPATIENT
Start: 2024-09-15 | End: 2024-09-16 | Stop reason: HOSPADM

## 2024-09-14 RX ORDER — KETOROLAC TROMETHAMINE 15 MG/ML
15 INJECTION, SOLUTION INTRAMUSCULAR; INTRAVENOUS EVERY 6 HOURS
Status: COMPLETED | OUTPATIENT
Start: 2024-09-14 | End: 2024-09-15

## 2024-09-14 RX ORDER — IBUPROFEN 800 MG/1
800 TABLET, FILM COATED ORAL EVERY 8 HOURS
Status: DISCONTINUED | OUTPATIENT
Start: 2024-09-15 | End: 2024-09-16 | Stop reason: HOSPADM

## 2024-09-14 RX ADMIN — KETOROLAC TROMETHAMINE 15 MG: 15 INJECTION, SOLUTION INTRAMUSCULAR; INTRAVENOUS at 18:43

## 2024-09-14 RX ADMIN — OXYTOCIN 125 ML/HR: 10 INJECTION, SOLUTION INTRAMUSCULAR; INTRAVENOUS at 00:07

## 2024-09-14 RX ADMIN — CEPHALEXIN 500 MG: 500 CAPSULE ORAL at 14:59

## 2024-09-14 RX ADMIN — KETOROLAC TROMETHAMINE 15 MG: 15 INJECTION, SOLUTION INTRAMUSCULAR; INTRAVENOUS at 04:50

## 2024-09-14 RX ADMIN — KETOROLAC TROMETHAMINE 15 MG: 15 INJECTION, SOLUTION INTRAMUSCULAR; INTRAVENOUS at 12:01

## 2024-09-14 RX ADMIN — ACETAMINOPHEN 1000 MG: 500 TABLET ORAL at 14:59

## 2024-09-14 RX ADMIN — IOHEXOL 100 ML: 350 INJECTION, SOLUTION INTRAVENOUS at 10:45

## 2024-09-14 RX ADMIN — DOCUSATE SODIUM 100 MG: 100 CAPSULE, LIQUID FILLED ORAL at 09:01

## 2024-09-14 RX ADMIN — CEPHALEXIN 500 MG: 500 CAPSULE ORAL at 20:21

## 2024-09-14 RX ADMIN — ACETAMINOPHEN 1000 MG: 500 TABLET ORAL at 20:21

## 2024-09-14 RX ADMIN — PRENATAL WITH FERROUS FUM AND FOLIC ACID 1 TABLET: 3080; 920; 120; 400; 22; 1.84; 3; 20; 10; 1; 12; 200; 27; 25; 2 TABLET ORAL at 09:02

## 2024-09-14 RX ADMIN — ACETAMINOPHEN 1000 MG: 500 TABLET ORAL at 02:59

## 2024-09-14 RX ADMIN — ACETAMINOPHEN 1000 MG: 500 TABLET ORAL at 09:01

## 2024-09-14 ASSESSMENT — PAIN DESCRIPTION - PAIN TYPE
TYPE: SURGICAL PAIN
TYPE: ACUTE PAIN
TYPE: SURGICAL PAIN
TYPE: ACUTE PAIN;SURGICAL PAIN
TYPE: ACUTE PAIN;SURGICAL PAIN
TYPE: SURGICAL PAIN
TYPE: ACUTE PAIN;SURGICAL PAIN
TYPE: ACUTE PAIN;SURGICAL PAIN

## 2024-09-14 ASSESSMENT — EDINBURGH POSTNATAL DEPRESSION SCALE (EPDS)
I HAVE BEEN SO UNHAPPY THAT I HAVE BEEN CRYING: NO, NEVER
I HAVE BEEN ABLE TO LAUGH AND SEE THE FUNNY SIDE OF THINGS: AS MUCH AS I ALWAYS COULD
I HAVE FELT SCARED OR PANICKY FOR NO GOOD REASON: NO, NOT AT ALL
I HAVE BLAMED MYSELF UNNECESSARILY WHEN THINGS WENT WRONG: YES, SOME OF THE TIME
I HAVE BEEN SO UNHAPPY THAT I HAVE HAD DIFFICULTY SLEEPING: NOT AT ALL
I HAVE LOOKED FORWARD WITH ENJOYMENT TO THINGS: AS MUCH AS I EVER DID
THINGS HAVE BEEN GETTING ON TOP OF ME: NO, MOST OF THE TIME I HAVE COPED QUITE WELL
I HAVE BEEN ANXIOUS OR WORRIED FOR NO GOOD REASON: NO, NOT AT ALL
THE THOUGHT OF HARMING MYSELF HAS OCCURRED TO ME: NEVER
I HAVE FELT SAD OR MISERABLE: NO, NOT AT ALL

## 2024-09-14 ASSESSMENT — PAIN SCALES - GENERAL: PAIN_LEVEL: 1

## 2024-09-14 NOTE — OP REPORT
DATE OF SERVICE:  2024     PREOPERATIVE DIAGNOSES:   1.  Intrauterine pregnancy at 39+3 weeks' gestation.  2.  GBS negative.  3.  Induction of labor for gestational hypertension.  4.  Gross hematuria.  5.  Arrest of dilation.  6.  Arrest of descent.  7.  Abnormal fetal heart rate, remote from delivery.  8.  Likely cephalopelvic disproportion.     POSTOPERATIVE DIAGNOSES:    1.  Intrauterine pregnancy at 39+3 weeks' gestation.  2.  GBS negative.  3.  Induction of labor for gestational hypertension.  4.  Gross hematuria.  5.  Arrest of dilation.  6.  Arrest of descent.  7.  Abnormal fetal heart rate, remote from delivery.  8.  Likely cephalopelvic disproportion.  9.  Left-sided hysterotomy extension into the broad ligament.  10.  Left-sided ruptured endometrioma.  11.  Uterine atony.     PROCEDURES:  1.  Primary low transverse  section via Pfannenstiel skin incision.  2.  Repair of the broad ligament laceration and her hysterotomy extension.  3.  Drainage of ruptured/lacerated left endometrioma.  4.  Placement of Surgicel powder and SNoW.     SURGEON:  Kalyn Linares MD     ASSISTANT:  Raiza Norwood MD     ANESTHESIOLOGIST:  Nolberto Hua MD     ANESTHESIA:  Epidural.     SPECIMENS:  Cord gases and placenta.     ESTIMATED BLOOD LOSS:  1200 mL.     FINDINGS:  Viable male infant in the ROT ROP position, asynclitic. Apgars of 8   at 1 minute, 9 at 5 minutes.  Moderate meconium-stained fluid noted.  No   nuchal cord.  There were 30 seconds of delayed cord clamping performed.    Infant weight 7 pounds 1 ounce.  Cord gases; arterial pH 7.22, base excess   -11, venous pH 7.29, base excess -7.  Otherwise, normal appearing uterus.    Uterine atony was noted.  The patient received IV Pitocin, IV TXA and 1000 mcg   of Cytotec at the end of the procedure.  The bilateral ovaries were notable   for bilateral ovarian cysts.  There was a left-sided endometrioma noted, and   this was visible  through a 4 cm laceration and extension of the uterine   incision.  The left- sided endometrioma was adherent to the posterior lower   uterine segment.  During the extension of the uterine incision, which was   performed bluntly, the endometrioma was lacerated and chocolate cystic fluid   was noted to be extruding from the ovarian cyst.  The broad ligament and the   lower uterine segment were avulsed and there was an opening measuring   approximately 4 cm.  There was no evidence of uterine artery avulsion or   laceration.  There was decidual reaction noted on the posterior serosal   surface of the uterus.  Otherwise, normal-appearing fallopian tubes.  The   uterus was exteriorized prior to repair of the uterine incision as an   extension was visible and in order to gain better visualization, the uterus   was exteriorized.  The right ovary was otherwise normal in appearance.  There   was the extension of the uterine incision into the left broad ligament with   rupture of an adherent left-sided endometrioma as it was intimately adherent   to the posterior serosal surface of the uterus and left broad ligament.  The   urine appeared more yellow post-delivery and prior to delivery it was orange   secondary to the Pyridium.     INDICATIONS FOR THE PROCEDURE:  The patient was admitted as a 34-year-old    1, para 0 at 39+2 weeks' gestation based upon her last menstrual   period of 2023 and consistent with a 10-week ultrasound.  The patient   was noted to have painful urination starting on 2024 at 38+6 weeks'   gestation with 3+ blood and blood pressure 149/101.  She was started on   Macrobid and Pyridium.  The patient was admitted on 2024 for induction   of labor secondary to blood pressures of 144/96.  Her protein creatinine ratio   was 1553.  On admission, the patient was noted to be fingertip thick and   high.  The patient received 25 mcg of buccal Cytotec.  She received 3 doses of   Cytotec.   She then underwent artificial rupture of membranes of clear fluid   at approximately 10:00 a.m. on 09/13/2024.  IUPC was placed at that time.  The   patient received an epidural for labor analgesia.  The patient was continued   on low dose Pitocin up to a maximum of 4 milliunits per minute.  She had a   category 1-2 tracing during labor.  There were isolated episodes of variable   and late decelerations but excellent variability during her labor.  At   approximately 17:50, her cervix was examined and she was 7 cm, 90% effaced, -2   station.  She had been started on an amnioinfusion secondary to an episode of   repetitive variable decelerations.  The patient was reexamined at   approximately 10:00 p.m. secondary to the Pitocin being increased to 2   milliunits per minute and a late deceleration.  Her cervix was unchanged and   there was now caput and molding and cervical swelling.  The risks, benefits   and alternatives of the procedure were discussed with the patient and she   agreed to proceed.     DETAILS OF THE PROCEDURE:  The patient was taken to the operating room where   her epidural was rebolused.  She was then prepped and draped in the dorsal   supine position with leftward tilt.  The patient had received IV azithromycin   and IV Ancef.  A vaginal prep was performed.  Of note, the patient had been   reporting and experiencing diarrhea since approximately 1900 and received a   dose of Imodium.  The patient was then prepped and draped in the dorsal supine   position with leftward tilt.  Her epidural anesthesia was found to be   adequate.  A Pfannenstiel skin incision was made with a scalpel.  The incision   was carried down to the level of the fascia with a scalpel.  Areas of   bleeding cauterized with electrocautery.  The fascia was incised with the Pizarro   scissors and the fascial incision was extended laterally with Pizarro scissors.    Areas of bleeding cauterized with electrocautery.  The rectus muscles were    dissected off the fascia in the usual fashion.  The rectus muscles were   divided in the midline.  The peritoneum was identified, grasped with pickups   and entered sharply with Metzenbaum scissors.  The peritoneum was stretched.    The Bryant O retractor was placed within the patient's pelvis and abdomen.    The vesicouterine peritoneum was visualized, grasped with pickups and entered   sharply with Metzenbaum scissors.  The incision was extended laterally with   Metzenbaum scissors and the bladder flap was gently created digitally.  A low   segment transverse incision was made with a scalpel.  This was extended   laterally with blunt dissection.  The vertex of the infant was noted to be in   the ROT to ROP position.  It was rotated, flexed and delivered through the   incisions.  There was no nuchal cord noted.  The infant's mouth and nose were   bulb suctioned.  Moderate meconium-stained fluid was noted.  The remainder of   the infant delivered without difficulty.  A 30 seconds of delayed cord   clamping was performed and then the cord was clamped and cut.  A segment of   cord was clamped and cut for cord gas and these results are as documented   above.  The placenta delivered intact with a 3-vessel cord and was sent to   pathology.  The uterus was cleared of amniotic fluid, blood clots and   membranes with moist laparotomy sponges.  The uterus was noted to be atonic.    The patient received IV Pitocin and vigorous fundal massage was performed.    The uterine incision was examined and grasped with Shepard clamps.  There   was a visible extension on the left lateral aspect of the uterine incision   through the broad ligament and the ovary was noted to be visible through the   broad ligament.  The boundary of the extension was examined, grasped with   pickups and reapproximated using 0 Vicryl.  The remainder of the uterine   incision was reapproximated with 0 Vicryl in a running locked fashion.  The   bilateral  tubes and ovaries were visualized and findings are as documented   above.  The boundaries of the extension were examined.  There was a through   and through rent in the broad ligament.  This was reapproximated using 0   Vicryl in a running fashion.  In this process, the left ovary was noted to be   slightly torn and endometrioma endometriotic chocolate cystic fluid was noted   to be extruding from the ovary.  This was fully drained and irrigated   copiously.  It was found to be hemostatic.  The ovary was left intact.  The   posterior serosal surface of the uterus was notable for endometriosis implants   and decidual reaction.  Extensive irrigation with sterile water was instilled   and there was no evidence of uterine artery laceration or venous laceration.    The defect in the broad ligament was thoroughly repaired using 0 Vicryl in a   figure-of-X fashion.  It was irrigated again and found to be hemostatic.  The   patient's uterine tone improved after the patient had received the IV Pitocin   and IV TXA.  The uterine incision was irrigated again with sterile water and   found to be hemostatic.  The uterus was returned to the patient's abdomen.    Multiple sheets of Surgicel and SNoW were applied to the full length of the   uterine incision.  The peritoneum was grasped with hemostats and   reapproximated with 2-0 Vicryl.  The rectus muscles and fascia were irrigated   with sterile water.  Areas of bleeding were cauterized with electrocautery.    The rectus muscles were reapproximated using 2-0 Vicryl in a vertical mattress   suture fashion.  The rectus fascia was closed with 0 PDS.  The subcutaneous   tissues were irrigated with sterile water.  Areas of bleeding cauterized with   electrocautery.  The subcutaneous tissues were reapproximated with 3-0 Vicryl,   first in an interrupted fashion to reapproximate the Joann's fascia and then   in a running fashion to bring the skin edges into closer proximity.  The  skin   was closed with 4-0 Monocryl in a subcuticular fashion.       Bimanual uterine massage and exploration was performed and small clots were   removed from the lower uterine segment.  The fundus was firm two below the   umbilicus.  The patient received 1000 mcg of Cytotec per rectum x1 after the   above examination.  The incision was dressed with benzoin, Steri-Strips and a   Mepilex dressing.     The patient was then transferred to the PACU in stable condition.  Sponge,   lap, needle and instrument counts were correct x2.        ______________________________  MD CRAIG Altman/MENDEZ    DD:  09/14/2024 00:02  DT:  09/14/2024 01:22    Job#:  [id number]    CC:TU VARNER MD

## 2024-09-14 NOTE — CONSULTS
Urology Consultation    Date of Service  2024    Referring Physician  Joann Mehta M.D.    Consulting Physician  Mahendra Wagner M.D.    Reason for Consultation  Gross hematuria    History of Presenting Illness  34 y.o. female who presented 2024 with gross hematuria approximately one week ago. She has had dysuria associated with the hematuria. She had some blood in her urine today prior to catheter removal but says it was brown. She has not voided since catheter removal. She underwent  yesterday. No n/v/f/c. Some abdominal pain associated with recent surgery. She has never had blood in the urine previously.    Review of Systems  ROS    Past Medical History   has a past medical history of Hypertension.    She has no past medical history of Asthma, Diabetes (HCC), or Disorder of thyroid.    Surgical History   has no past surgical history on file.    Family History  family history is not on file.    Social History   reports that she has never smoked. She has never used smokeless tobacco. She reports that she does not drink alcohol and does not use drugs.    Medications  Prior to Admission Medications   Prescriptions Last Dose Informant Patient Reported? Taking?   Prenatal MV-Min-Fe Fum-FA-DHA (PRENATAL 1 PO) 2024 at 0900  Yes Yes   Sig: Take  by mouth.   aspirin 81 MG EC tablet 2024 at 0900  Yes Yes   Sig: Take 81 mg by mouth every day.      Facility-Administered Medications: None       Allergies  No Known Allergies    Physical Exam  Temp:  [35.9 °C (96.7 °F)-37.4 °C (99.4 °F)] 37.1 °C (98.8 °F)  Pulse:  [] 86  Resp:  [18-20] 18  BP: (114-164)/(64-93) 139/81  SpO2:  [85 %-100 %] 100 %    Physical Exam  Constitutional:       Appearance: Normal appearance.   HENT:      Head: Normocephalic and atraumatic.   Pulmonary:      Effort: Pulmonary effort is normal.   Abdominal:      Tenderness: There is abdominal tenderness. There is no right CVA tenderness or left CVA tenderness.    Skin:     General: Skin is warm and dry.   Neurological:      General: No focal deficit present.      Mental Status: She is alert.   Psychiatric:         Mood and Affect: Mood normal.         Behavior: Behavior normal.         Fluids  Date 24 0700 - 09/15/24 0659   Shift 5658-7656 9806-3806 1812-5987 24 Hour Total   INTAKE   Shift Total       OUTPUT   Urine 130   130   Shift Total 130   130   Weight (kg) 70.3 70.3 70.3 70.3       Laboratory  Recent Labs     24  1242 24  2338 24  1051   WBC 6.5 12.6* 17.8*   RBC 4.40 3.77* 3.45*   HEMOGLOBIN 13.1 11.4* 10.4*   HEMATOCRIT 40.3 34.6* 31.3*   MCV 91.6 91.8 90.7   MCH 29.8 30.2 30.1   MCHC 32.5 32.9 33.2   RDW 53.9* 53.8* 53.5*   PLATELETCT 254 197 208   MPV 10.9 10.1 10.1     Recent Labs     24  1242   SODIUM 135   POTASSIUM 4.3   CHLORIDE 102   CO2 18*   GLUCOSE 74   BUN 13   CREATININE 0.60   CALCIUM 8.9     Recent Labs     24  1242 24  2338   APTT 27.2 27.9   INR 0.93 1.04                 Imaging  CT-ABDOMEN & PELVIS UROGRAM   Final Result      1.  No suspicious renal, ureteral or bladder mass lesions.   2.  No renal, ureteral or bladder stones. No hydronephrosis.   3.  Normal postoperative appearance of postpartum uterus status post  section.   4.  Small amount of postoperative pneumoperitoneum.         IR-US GUIDED PIV   Final Result    Ultrasound-guided PERIPHERAL IV INSERTION performed by    qualified nursing staff as above.      US-RENAL   Final Result      1.  Normal renal ultrasound. No hydronephrosis.   2.  Gravid uterus with a fetus in vertex presentation, not otherwise evaluated.   3.  The bladder is unremarkable.          Assessment/Plan  Mrs. Cheatham is a 34 year old woman with gross hematuria associated with dysuria. Original urine culture from several days ago was negative, however, urinalysis today shows nitrite positive concerning for possible infection. CT urogram is negative. She will need an  outpatient cystoscopy to complete her evaluation. Her urine was brown this morning suggesting that there is not currently active bleeding. If her hematuria worsens then we can consider inpatient cystoscopy with possible fulguration.    Will schedule for outpatient cystoscopy  Treat empirically for UTI  Urine culture recommended    Service: Urology

## 2024-09-14 NOTE — PROGRESS NOTES
0700: Received report from Gemma Gonzalez.  Patient in postpartum bed, patient comfortable and alert.  Patient assessment completed, fundus firm and palpable, lochia light rubra. Pain management and interventions discussed with pt.  plan of care reviewed,  and verbalized understanding and will call if needs anything.     0945: Patient off the floor for Exam.   1230: Labs reported to MD ADRIAN orders placed.

## 2024-09-14 NOTE — PROGRESS NOTES
IUP at 39w3d.  GBS negative.  IOL for gestational hypertension.  Hematuria.    S:  Patient is doing well.  She is comfortable with the epidural and is reporting increased pressure. She is also having some diarrhea at times.  She denies fevers/chills/night sweats.    O:  BP (!) 144/88   Pulse (!) 112   Temp 36.3 °C (97.4 °F) (Oral)   Resp 18   Ht 1.524 m (5')   Wt 70.3 kg (155 lb)   SpO2 97%     A, A, and O x 3 NAD    IUPC: 120-150 MVUs.  On 1 mU/minute of IV pitocin.    FSE: category I-II tracing.  Reactive with occasional early decelerations.  One late deceleration at 1810 that was resolved with position changes.    Vertex    EFW - 3500 grams    SVE per RN at 1750 7/90%/-2.    Amnioinfusion at 60 cc/hour     Latest Reference Range & Units 09/12/24 16:48   Color  Orange !   Character  Cloudy !   Specific Gravity <1.035  1.018   Ph 5.0 - 8.0  6.0   Glucose Negative mg/dL Negative   Ketones Negative mg/dL Trace !   Bilirubin Negative  Negative   Occult Blood Negative  Large !   Protein Negative mg/dL 100 !   Nitrite Negative  Negative   Leukocyte Esterase Negative  Small !   Urobilinogen, Urine Negative  0.2   Micro Urine Req  Microscopic   WBC /hpf 10-20 !   RBC /hpf  !   Epithelial Cells /hpf Few   Epithelial Cells Renal /hpf Moderate !   Bacteria None /hpf Negative   Hyaline Cast /lpf 0-2   !: Data is abnormal     Latest Reference Range & Units 09/12/24 13:39 09/12/24 16:48   Creatinine, Random Urine mg/dL 89.50    Total Protein, Urine 0.0 - 15.0 mg/dL 139.0 (H)    Protein Creatinine Ratio 10 - 107 mg/g 1553 (H)    Urobilinogen, Urine Negative   0.2   (H): Data is abnormally high     Latest Reference Range & Units 09/12/24 12:42   WBC 4.8 - 10.8 K/uL 6.5   RBC 4.20 - 5.40 M/uL 4.40   Hemoglobin 12.0 - 16.0 g/dL 13.1   Hematocrit 37.0 - 47.0 % 40.3   MCV 81.4 - 97.8 fL 91.6   MCH 27.0 - 33.0 pg 29.8   MCHC 32.2 - 35.5 g/dL 32.5   RDW 35.9 - 50.0 fL 53.9 (H)   Platelet Count 164 - 446 K/uL 254   MPV 9.0 -  12.9 fL 10.9   Neutrophils-Polys 44.00 - 72.00 % 69.20   Neutrophils (Absolute) 1.82 - 7.42 K/uL 4.52   Lymphocytes 22.00 - 41.00 % 24.50   Lymphs (Absolute) 1.00 - 4.80 K/uL 1.60   Monocytes 0.00 - 13.40 % 4.70   Monos (Absolute) 0.00 - 0.85 K/uL 0.31   Eosinophils 0.00 - 6.90 % 0.50   Eos (Absolute) 0.00 - 0.51 K/uL 0.03   Basophils 0.00 - 1.80 % 0.50   Baso (Absolute) 0.00 - 0.12 K/uL 0.03   Immature Granulocytes 0.00 - 0.90 % 0.60   Immature Granulocytes (abs) 0.00 - 0.11 K/uL 0.04   Nucleated RBC 0.00 - 0.20 /100 WBC 0.00   NRBC (Absolute) K/uL 0.00   Sodium 135 - 145 mmol/L 135   Potassium 3.6 - 5.5 mmol/L 4.3   Chloride 96 - 112 mmol/L 102   Co2 20 - 33 mmol/L 18 (L)   Anion Gap 7.0 - 16.0  15.0   Glucose 65 - 99 mg/dL 74   Bun 8 - 22 mg/dL 13   Creatinine 0.50 - 1.40 mg/dL 0.60   GFR (CKD-EPI) >60 mL/min/1.73 m 2 120   Calcium 8.5 - 10.5 mg/dL 8.9   Correct Calcium 8.5 - 10.5 mg/dL 9.3   AST(SGOT) 12 - 45 U/L 19   ALT(SGPT) 2 - 50 U/L 12   Alkaline Phosphatase 30 - 99 U/L 187 (H)   Total Bilirubin 0.1 - 1.5 mg/dL 0.2   Albumin 3.2 - 4.9 g/dL 3.5   Total Protein 6.0 - 8.2 g/dL 7.1   Globulin 1.9 - 3.5 g/dL 3.6 (H)   A-G Ratio g/dL 1.0   Uric Acid 1.9 - 8.2 mg/dL 6.3   LDH Total 107 - 266 U/L 231   (H): Data is abnormally high  (L): Data is abnormally low    A/P: IUP at 39w3d.  IOL for gestational hypertension.  GBS negative.  LGA fetus.   Continue IV pitocin.  Anticipate .  Prepare for possible PPH and shoulder dystocia.  Monitor blood pressure.  Urology consultation after delivery secondary to alba hematuria.

## 2024-09-14 NOTE — CARE PLAN
The patient is Stable - Low risk of patient condition declining or worsening    Shift Goals  Clinical Goals: pain control  Patient Goals: resting  Family Goals: bonding    Progress made toward(s) clinical / shift goals:       Patient will continue to mobilize after surgery, along with walking the hallway and wearing abdominal binder for more support.     Patients pain will continue to be managed with tylenol and motrin scheduled.    Patient is not progressing towards the following goals:

## 2024-09-14 NOTE — OR SURGEON
Immediate Post OP Note    PreOp Diagnosis:      IUP at 39w3d.  GBS negative.  IOL for gestational hypertension.  Hematuria - gross.  Arrest of dilation.  Arrest of descent.  Abnormal fetal heart rate remote from delivery.      PostOp Diagnosis:     As above.  Left sided extension into the broad ligament.  Left sided endometrioma ruptured.    PROCEDURE: PRIMARY LTCS VIA PFANNENSTIEL SKIN INCISION.   REPAIR OF BROAD LIGAMENT LACERATION AND HYSTEROTOMY EXTENSION.  DRAINAGE OF LACERATED LEFT ENDOMETRIOMA  PLACEMENT OF SURGICEL POWDER AND SNOW.    SURGEON: DR. MAUDE THIBODEAUX.    ASSISTANT: DR. BRADY POLO    Anesthesiologist/Type of Anesthesia:  Dr. Cornell Hua/Epidural.    Surgical Staff:  * Surgery not found *    Specimens removed if any:  Cord gases.  2.  Placenta.    Estimated Blood Loss: 1200 cc    Findings: Viable male infant in the ROT/ROP position, asyclitic.  APGARS of 8 at one minute and 9 at five minutes.  Moderate meconium stained fluid noted.  No nuchal cord.  30 seconds of delayed cord clamping performed.  INFANT WEIGHT - 7 POUNDS 1 OUNCE.      Cord gases:     Latest Reference Range & Units 09/13/24 22:50   Cord Bg Ph  7.22   Cord Bg Pco2 mmHg 38.9   Cord Bg Po2 mmHg 19.8   Cord Bg Hco3 mmol/L 16   Cord Bg Base Excess mmol/L -11   Cord Bg O2 Saturation % 24.9   CV Ph  7.29   CV Pco2 mmHg 42.8   CV Po2  18.6   CV Hco3 mmol/L 20   CV Base Excess mmol/L -7   CV O2 Saturation % 33.7       Normal uterus.  Uterine atony noted.  Patient received IV pitocin, IV TXA, and 1000 mcg of cytotec at the end of the procedure.  Surgicel powder and Surgicel snow x 4 applied to the uterus.  Bilateral ovaries notable for bilateral ovarian cysts - left sided endometrioma noted and was adherent to the posterior lower uterine segment and was lacerated during the extension of the uterine incision (bluntly) and endometriotic/chocolate cystic fluid was noted.  Broad ligament in the lower uterine segment was avulsed.  No  evidence of uterine artery avulsion or laceration.  Decidual reaction was noted on the posterior serosal surface of the uterus.  Uterus was exteriorized during the procedure to provide better visualization.  Normal bilateral tubes.  Right ovary was otherwise normal in appearance.  Extension of the uterine incision to the left broad ligament with rupture of an adherent left sided endometrioma as it was intimately adherent to the posterior serosal surface of the uterus and left broad ligament..      Urine appeared more yellow post delivery and prior to delivery was orange (consistent with the pyridium)    Complications: none apparent.        9/13/2024 10:21 PM Kalyn Linares M.D.

## 2024-09-14 NOTE — PROGRESS NOTES
POD #1 s/p primary LTCS via Pfannenstiel skin incision for Arrest of dilation/descent, abnormal fetal heart rate remote from delivery, and CPD after IOL for gestational hypertension.     S:  Doing well.  Pain is well controlled. She denies fevers/chills/night sweats/nausea/vomiting.  Discussed plan for urology consultation secondary to gross hematuria for the past 7-10 days.  She had diarrhea during the last few hours of her labor yesterday and she currently denies this at this time.  She is tolerating a regular diet.  She is working on breast feeding.    O:  BP (!) 138/90   Pulse 97   Temp 36.7 °C (98 °F) (Temporal)   Resp 18   Ht 1.524 m (5')   Wt 70.3 kg (155 lb)   SpO2 93%     A, A, and O x 3 NAD    FF u/1    No c/c/e    Incision: clean/dry/intact.  Mepilex dressing in place.    Recent Labs     09/12/24  1242 09/13/24  2338   WBC 6.5 12.6*   RBC 4.40 3.77*   HEMOGLOBIN 13.1 11.4*   HEMATOCRIT 40.3 34.6*   MCV 91.6 91.8   MCH 29.8 30.2   RDW 53.9* 53.8*   PLATELETCT 254 197   MPV 10.9 10.1   NEUTSPOLYS 69.20 89.30*   LYMPHOCYTES 24.50 6.80*   MONOCYTES 4.70 3.40   EOSINOPHILS 0.50 0.00   BASOPHILS 0.50 0.20       Intake/Output Summary (Last 24 hours) at 9/14/2024 0755  Last data filed at 9/14/2024 0554  Gross per 24 hour   Intake 3775.32 ml   Output 2900 ml   Net 875.32 ml     A/P: POD #1 s/p primary LTCS.     Oliver hematuria - improved since delivery.  Plan urology consultation. Patient with a history of endometriosis and it is possible that the hematuria is related to endometriosis.  Gestational hypertension.  Monitor blood pressures.  Ambulate TID.  ADAT.  Work on breast feeding.  Continue cares.

## 2024-09-14 NOTE — ANESTHESIA TIME REPORT
Anesthesia Start and Stop Event Times       Date Time Event    9/13/2024 1016 Ready for Procedure     1104 Anesthesia Start     2337 Anesthesia Stop          Responsible Staff  09/13/24      Name Role Begin End    Nolberto Hua M.D. Anesth 1104 [id number]          Overtime Reason:  no overtime (within assigned shift)    Comments:

## 2024-09-14 NOTE — ANESTHESIA POSTPROCEDURE EVALUATION
Patient: Renee Cheatham    Procedure Summary       Date: 24 Room / Location: [facility] OR 02 / SURGERY LABOR AND DELIVERY    Anesthesia Start: 4 Anesthesia Stop:     Procedure:  SECTION, PRIMARY (Abdomen) Diagnosis: (Failure to progress)    Surgeons: Kalyn Linares M.D. Responsible Provider: Nolberto Hua M.D.    Anesthesia Type: epidural ASA Status: 2            Final Anesthesia Type: epidural  Last vitals  BP   Blood Pressure: 125/75    Temp   36.3 °C (97.4 °F)    Pulse   (!) 116   Resp   18    SpO2   94 %      Anesthesia Post Evaluation    Patient location during evaluation: PACU  Patient participation: complete - patient participated  Level of consciousness: awake and alert  Pain score: 1    Airway patency: patent  Anesthetic complications: no  Cardiovascular status: hemodynamically stable  Respiratory status: acceptable  Hydration status: euvolemic    PONV: none          No notable events documented.     Nurse Pain Score: 1 (NPRS)

## 2024-09-14 NOTE — PROGRESS NOTES
Patient transferred from [facility] in wheelchair, with infant in arms and FOB accompanying with belongings. Two RN verification or infant and parents armband. Report received from Mackenzie PEREA. Patient oriented to unit, call light use, safety and POC. Fundus firm and palpable. Lochia light rubra. Abdm incision CDI. Second bag of Pit infusing @ 125ml/hr. IV patent, no sings of infiltration. Pt encouraged to call with any needs.

## 2024-09-14 NOTE — LACTATION NOTE
This note was copied from a baby's chart.  Primip Mom is very happy with her baby. She has been BF with baby. He is very tired now. We attempted to latch in football hold on the right side, He latches but quickly falls back asleep. L-6 Taught HE to aid with let down prior to latching or for collecting colostrum and feeding back to baby with a spoon if he is sleepy. Taught frequent empyting of both breasts to help initiate her supply and prevent issues with mastitis. She should see her supply increase in about 72 hours post delivery. Keep baby STS to allow to BF ad marbella per his feeding cues, minimum of 10 times daily. Wake to feed if greater than 2 hours during the day, or 3-4 hours during the night since previous BF. Provided written educational materials. FOB and Maternal Grandma are bedside and are helpful. Encouraged to call for any additional LC assistance if needed.

## 2024-09-14 NOTE — PROGRESS NOTES
IUP at 39w3d.  GBS negative.  IOL for gestational hypertension.  Hematuria.  Arrest of dilation.  Arrest of descent.  Abnormal fetal heart rate remote from delivery.    S: Doing ok.  Feeling pressure.  Called to the room to assess her for a late deceleration after the IV pitocin was increased to 2 mU/minute.  Discussed the fact that her cervix has not changed and now there is swelling to the cervix and swelling/caput/molding and that I recommend a primary LTCS.  Discussed the risks, benefits, and alternatives of a primary LTCS and she agrees to proceed.  IV pitocin has been discontinued.    O:  /87   Pulse (!) 123   Temp 36.3 °C (97.4 °F) (Oral)   Resp 18   Ht 1.524 m (5')   Wt 70.3 kg (155 lb)   SpO2 97%     A, A, and O x 3 NAD    SVE: 7/90%/-2/caput/molding and swelling of anterior lip.    IUPC: 150 MVUS on 2 mU/minute of IV pitocin.    FSE: category I-II tracing. Reactive with a late deceleration at 2143.    Vertex    EFW - 3500 grams    A/P: IUP at 39w3d.  Active labor.  GBS negative.  Arrest of dilation and descent with abnormal fetal heart rate remote from delivery.     To OR for primary LTCS via Pfannenstiel skin incision.  IV Azithromycin and Ancef at delivery.  Cord gases and placenta to pathology.  Prepare for PPH - IV TXA available and cytotec 1000 mcg per rectum at the end of the procedure.  Risks, benefits, and alternatives discussed and she agrees to proceed.

## 2024-09-15 PROCEDURE — 700111 HCHG RX REV CODE 636 W/ 250 OVERRIDE (IP): Mod: JZ | Performed by: ANESTHESIOLOGY

## 2024-09-15 PROCEDURE — A9270 NON-COVERED ITEM OR SERVICE: HCPCS | Performed by: OBSTETRICS & GYNECOLOGY

## 2024-09-15 PROCEDURE — 770002 HCHG ROOM/CARE - OB PRIVATE (112)

## 2024-09-15 PROCEDURE — 700102 HCHG RX REV CODE 250 W/ 637 OVERRIDE(OP): Performed by: OBSTETRICS & GYNECOLOGY

## 2024-09-15 RX ADMIN — PRENATAL WITH FERROUS FUM AND FOLIC ACID 1 TABLET: 3080; 920; 120; 400; 22; 1.84; 3; 20; 10; 1; 12; 200; 27; 25; 2 TABLET ORAL at 08:29

## 2024-09-15 RX ADMIN — IBUPROFEN 800 MG: 800 TABLET, FILM COATED ORAL at 05:22

## 2024-09-15 RX ADMIN — CEPHALEXIN 500 MG: 500 CAPSULE ORAL at 18:19

## 2024-09-15 RX ADMIN — CEPHALEXIN 500 MG: 500 CAPSULE ORAL at 11:28

## 2024-09-15 RX ADMIN — CEPHALEXIN 500 MG: 500 CAPSULE ORAL at 05:22

## 2024-09-15 RX ADMIN — IBUPROFEN 800 MG: 800 TABLET, FILM COATED ORAL at 13:47

## 2024-09-15 RX ADMIN — ACETAMINOPHEN 1000 MG: 500 TABLET ORAL at 13:47

## 2024-09-15 RX ADMIN — ACETAMINOPHEN 1000 MG: 500 TABLET ORAL at 19:48

## 2024-09-15 RX ADMIN — ACETAMINOPHEN 1000 MG: 500 TABLET ORAL at 08:29

## 2024-09-15 RX ADMIN — KETOROLAC TROMETHAMINE 15 MG: 15 INJECTION, SOLUTION INTRAMUSCULAR; INTRAVENOUS at 00:23

## 2024-09-15 ASSESSMENT — PAIN DESCRIPTION - PAIN TYPE
TYPE: SURGICAL PAIN
TYPE: SURGICAL PAIN
TYPE: ACUTE PAIN;SURGICAL PAIN
TYPE: ACUTE PAIN
TYPE: ACUTE PAIN
TYPE: ACUTE PAIN;SURGICAL PAIN

## 2024-09-15 NOTE — PROGRESS NOTES
Assumed patient care at 0700. Received bedside report from RN. Bands in place and verified with infants. Patient resting comfortably with no needs.

## 2024-09-15 NOTE — PROGRESS NOTES
Assessment completed, abdomen incision dressing is CDI> Fundus is firm, lochia light, patient voiding normally. Pt ambulating independently. Bands verified matching baby. Needs are met at this time. Encouraged to call with any needs.

## 2024-09-15 NOTE — CARE PLAN
The patient is Stable - Low risk of patient condition declining or worsening    Shift Goals  Clinical Goals: pain control; three step feeding plan  Patient Goals: rest  Family Goals: support    Progress made toward(s) clinical / shift goals:  Educated patient on pain rating scales, frequent pain assessments in place, medicating per MAR, non pharmaceutical pain relief such as heat pads and positioning in use.        Patient is not progressing towards the following goals:

## 2024-09-15 NOTE — CARE PLAN
The patient is Stable - Low risk of patient condition declining or worsening    Shift Goals  Clinical Goals: breastfeed and pain control  Patient Goals: rest  Family Goals: support    Progress made toward(s) clinical / shift goals:    Problem: Knowledge Deficit - Standard  Goal: Patient and family/care givers will demonstrate understanding of plan of care, disease process/condition, diagnostic tests and medications  Description: Target End Date:  1-3 days or as soon as patient condition allows    Document in Patient Education    1.  Patient and family/caregiver oriented to unit, equipment, visitation policy and means for communicating concern  2.  Complete/review Learning Assessment  3.  Assess knowledge level of disease process/condition, treatment plan, diagnostic tests and medications  4.  Explain disease process/condition, treatment plan, diagnostic tests and medications  Outcome: Progressing     Problem: Knowledge Deficit - Postpartum  Goal: Patient will verbalize and demonstrate understanding of self and infant care  Description: Target End Date:  1-3 days or as soon as patient condition allows    Document in Patient Education    1.  Assess patient and knowledge of self and infant care  2.  Educate patient verbally, by demonstration and written material  Outcome: Progressing     Problem: Psychosocial - Postpartum  Goal: Patient will verbalize and demonstrate effective bonding and parenting behavior  Description: Target End Date:  1 to 4 days    1.  Assess patient for anxiety or apprehension regarding parenting role  2.  Provide emotional support and encouragement to patient/family/caregiver  Outcome: Progressing       Patient is not progressing towards the following goals:

## 2024-09-15 NOTE — LACTATION NOTE
Renee is a 34 year old . Prenatal history includes GHTN and Endometriosis. Her son was delivered via C/S on  at 2243. His birth weight was 3200 grams/ 7 lbs. 0.9 ounces and his current weight is 3035 grams/ 6 lbs. 11.1 ounces, (-5.2%).    Per Renee baby has not been able to successfully latch. As of yesterday they started  pumping and formula feeding. Renee has also been doing some hand expression to entice baby to breast. Parents report that baby has been sleepy.    Lola's breasts are soft, symmetrical  with everted nipples.     Baby is currently in the nursery post circumcision. Parents state they will call for JEFF at next feeding.    Parents encouraged to [facility] for [facility]-up.

## 2024-09-15 NOTE — PROGRESS NOTES
"62-13\" abdominal binder sent to tube station 32, RN notified.     Contact traction for any questions or concerns.   "

## 2024-09-16 ENCOUNTER — PHARMACY VISIT (OUTPATIENT)
Dept: PHARMACY | Facility: MEDICAL CENTER | Age: 34
End: 2024-09-16
Payer: COMMERCIAL

## 2024-09-16 VITALS
HEART RATE: 80 BPM | SYSTOLIC BLOOD PRESSURE: 129 MMHG | HEIGHT: 60 IN | RESPIRATION RATE: 17 BRPM | OXYGEN SATURATION: 98 % | DIASTOLIC BLOOD PRESSURE: 81 MMHG | TEMPERATURE: 98.2 F | WEIGHT: 155 LBS | BODY MASS INDEX: 30.43 KG/M2

## 2024-09-16 LAB
BACTERIA UR CULT: ABNORMAL
BACTERIA UR CULT: ABNORMAL
PATHOLOGY CONSULT NOTE: NORMAL
SIGNIFICANT IND 70042: ABNORMAL
SITE SITE: ABNORMAL
SOURCE SOURCE: ABNORMAL

## 2024-09-16 PROCEDURE — 700102 HCHG RX REV CODE 250 W/ 637 OVERRIDE(OP): Performed by: OBSTETRICS & GYNECOLOGY

## 2024-09-16 PROCEDURE — RXMED WILLOW AMBULATORY MEDICATION CHARGE: Performed by: OBSTETRICS & GYNECOLOGY

## 2024-09-16 PROCEDURE — A9270 NON-COVERED ITEM OR SERVICE: HCPCS | Performed by: OBSTETRICS & GYNECOLOGY

## 2024-09-16 RX ORDER — ACETAMINOPHEN 500 MG
500-1000 TABLET ORAL EVERY 6 HOURS PRN
Qty: 30 TABLET | Refills: 1 | Status: SHIPPED | OUTPATIENT
Start: 2024-09-16

## 2024-09-16 RX ORDER — CEPHALEXIN 500 MG/1
500 CAPSULE ORAL 3 TIMES DAILY
Qty: 21 CAPSULE | Refills: 0 | Status: ACTIVE | OUTPATIENT
Start: 2024-09-16 | End: 2024-09-23

## 2024-09-16 RX ORDER — IBUPROFEN 600 MG/1
600 TABLET, FILM COATED ORAL EVERY 6 HOURS PRN
Qty: 30 TABLET | Refills: 1 | Status: SHIPPED | OUTPATIENT
Start: 2024-09-16

## 2024-09-16 RX ORDER — PSEUDOEPHEDRINE HCL 30 MG
100 TABLET ORAL 2 TIMES DAILY PRN
Qty: 60 CAPSULE | Refills: 1 | Status: SHIPPED | OUTPATIENT
Start: 2024-09-16

## 2024-09-16 RX ORDER — OXYCODONE HYDROCHLORIDE 5 MG/1
5 TABLET ORAL EVERY 4 HOURS PRN
Qty: 30 TABLET | Refills: 0 | Status: SHIPPED | OUTPATIENT
Start: 2024-09-16 | End: 2024-09-23

## 2024-09-16 RX ORDER — SIMETHICONE 125 MG
125 TABLET,CHEWABLE ORAL 4 TIMES DAILY PRN
Qty: 120 TABLET | Refills: 1 | Status: SHIPPED | OUTPATIENT
Start: 2024-09-16

## 2024-09-16 RX ADMIN — CEPHALEXIN 500 MG: 500 CAPSULE ORAL at 11:56

## 2024-09-16 RX ADMIN — ACETAMINOPHEN 1000 MG: 500 TABLET ORAL at 08:27

## 2024-09-16 RX ADMIN — ACETAMINOPHEN 1000 MG: 500 TABLET ORAL at 14:02

## 2024-09-16 RX ADMIN — OXYCODONE 5 MG: 5 TABLET ORAL at 02:13

## 2024-09-16 RX ADMIN — PRENATAL WITH FERROUS FUM AND FOLIC ACID 1 TABLET: 3080; 920; 120; 400; 22; 1.84; 3; 20; 10; 1; 12; 200; 27; 25; 2 TABLET ORAL at 08:27

## 2024-09-16 RX ADMIN — ACETAMINOPHEN 1000 MG: 500 TABLET ORAL at 02:13

## 2024-09-16 RX ADMIN — IBUPROFEN 800 MG: 800 TABLET, FILM COATED ORAL at 06:15

## 2024-09-16 RX ADMIN — IBUPROFEN 800 MG: 800 TABLET, FILM COATED ORAL at 14:02

## 2024-09-16 RX ADMIN — CEPHALEXIN 500 MG: 500 CAPSULE ORAL at 06:14

## 2024-09-16 RX ADMIN — DOCUSATE SODIUM 100 MG: 100 CAPSULE, LIQUID FILLED ORAL at 08:27

## 2024-09-16 ASSESSMENT — PAIN DESCRIPTION - PAIN TYPE
TYPE: SURGICAL PAIN

## 2024-09-16 NOTE — DISCHARGE INSTRUCTIONS
PATIENT DISCHARGE EDUCATION INSTRUCTION SHEET    REASONS TO CALL YOUR OBSTETRICIAN  Persistent fever, shaking, chills (Temperature higher than 100.4) may indicate you have an infection  Heavy bleeding: soaking more than 1 pad per hour; Passing clots an egg-sized clot or bigger may mean you have an postpartum hemorrhage  Foul odor from vagina or bad smelling or discolored discharge or blood  Breast infection (Mastitis symptoms); breast pain, chills, fever, redness or red streaks, may feel flu like symptoms  Urinary pain, burning or frequency  Incision that is not healing, increased redness, swelling, tenderness or pain, or any pus from episiotomy or  site may mean you have an infection  Redness, swelling, warmth, or painful to touch in the calf area of your leg may mean you have a blood clot  Severe or intensified depression, thoughts or feelings of wanting to hurt yourself or someone else   Pain in chest, obstructed breathing or shortness of breath (trouble catching your breath) may mean you are having a postpartum complication. Call your provider immediately   Headache that does not get better, even after taking medicine, a bad headache with vision changes or pain in the upper right area of your belly may mean you have high blood pressure or post birth preeclampsia. Call your provider immediately    HAND WASHING  All family and friends should wash their hands:  Before and after holding the baby  Before feeding the baby  After using the restroom or changing the baby's diaper    WOUND CARE  Ask your physician for additional care instructions. In general:   Incision:  May shower and pat incision dry   Keep the incision clean and dry  There should not be any opening or pus from the incision  Continue to walk at home 3 times a day   Do NOT lift anything heavier than your baby (over 10 pounds)  Encourage family to help participate in care of the  to allow rest and mom time to heal  Continue to  "use lacy-bottle until bleeding stops as needed    VAGINAL CARE AND BLEEDING  Nothing inside vagina for 6 weeks:   No sexual intercourse, tampons or douching  Bleeding may continue for 2-4 weeks. Amount and color may vary  Soaking 1 pad or more in an hour for several hours is considered heavy bleeding  Passing large egg sized blood clots can be concerning  If you feel like you have heavy bleeding or are having increasing amount of blood clots call your Obstetrician immediately  If you begin feeling faint upon standing, feeling sick to your stomach, have clammy skin, a really fast heartbeat, have chills, start feeling confused, dizzy, sleepy or weak, or feeling like you're going to faint call your Obstetrician immediately    HYPERTENSION   Preeclampsia or gestational hypertension are types of high blood pressure that only pregnant women can get. It is important for you to be aware of symptoms to seek early intervention and treatment. If you have any of these symptoms immediately call your Obstetrician    Vision changes or blurred vision   Severe headache or pain that is unrelieved with medication and will not go away  Persistent pain in upper abdomen or shoulder   Increased swelling of face, feet, or hands  Difficulty breathing or shortness of breath at rest  Urinating less than usual    URINATION AND BOWEL MOVEMENTS  Eating more fiber (bran cereal, fruits, and vegetables) and drinking plenty of fluids will help to avoid constipation  Urinary frequency and urgency after childbirth is normal  If you experience any urinary pain, burning or frequency call your provider    BIRTH CONTROL  It is possible to become pregnant at any time after delivery and while breastfeeding  Plan to discuss a method of birth control with your physician at your post delivery follow up visit    POSTPARTUM BLUES  During the first few days after birth, you may experience a sense of the \"blues\" which may include impatience, irritability or even " "crying. These feelings come and go quickly. However, as many as 1 in 10 women experience emotional symptoms known as postpartum depression.     POSTPARTUM DEPRESSION    May start as early as the second or third day after delivery or take several weeks or months to develop. Symptoms of \"blues\" are present, but are more intense: Crying spells; loss of appetite; feelings of hopelessness or loss of control; fear of touching the baby; over concern or no concern at all about the baby; little or no concern about your own appearance/caring for yourself; and/or inability to sleep or excessive sleeping. Contact your Obstetrician if you are experiencing any of these symptoms     PREVENTING SHAKEN BABY  If you are angry or stressed, PUT THE BABY IN THE CRIB, step away, take some deep breaths, and wait until you are calm to care for the baby. DO NOT SHAKE THE BABY. You are not alone, call a supporter for help.  [facility] 24/7 crisis call line (240-582-0574) or (1-883.881.2409)  You can also text them, text \"ANSWER\" (232179)  "

## 2024-09-16 NOTE — PROGRESS NOTES
A bedside report received from Janell PEREA @ 0700.  Assumed care. Discussed plan of care. Assessment done. Schedule pain medication given. She's ambulating well and voiding without difficulty. She claimed that she already had a bowel movement yesterday and passing gas. Call light is within reach. Encouraged to call if with needs. Will check at intervals.

## 2024-09-16 NOTE — PROGRESS NOTES
Discharge instruction discussed. She have received her medications for home. Emphasized the importance of  screening follow-up test. Questions and concerns have been answered.

## 2024-09-16 NOTE — CARE PLAN
Problem: Altered Physiologic Condition  Goal: Patient physiologically stable as evidenced by normal lochia, palpable uterine involution and vitals within normal limits  Outcome: Progressing     Problem: Pain - Standard  Goal: Alleviation of pain or a reduction in pain to the patient’s comfort goal  Outcome: Progressing   The patient is Stable - Low risk of patient condition declining or worsening    Shift Goals  Clinical Goals: maintain a firm fundus with scant to light bleeding/ maintain adequate pain control  Patient Goals: rest  Family Goals: support    Progress made toward(s) clinical / shift goals:  Fundus firm. Lochia scant. Patient educated to call if saturating a pad in more than hour or for large clots. Patient requests to call for pain medication when needed. Patient aware of available prn medication and available nonpharmacologic pain intervention. Patient able to care for self and infant at current pain level.     Patient is not progressing towards the following goals:

## 2024-09-16 NOTE — LACTATION NOTE
This note was copied from a baby's chart.  Follow-up LC visit, parents report they are following a 3 step feeding plan due to sleepy latching at breast, able to latch successfully a few times last night, struggling with positioning at breast and wish to review football hold. Reviewed football hold with pillow support, infant did a few sucks at breast but otherwise sleepy, parents report drinking formula well from bottle, supplemental feeding volume guidelines reviewed. Reviewed pump use and settings, washing of pump parts, collection and storage of breast milk, and HG pump rental info. Reviewed milk onset, hunger cues, paced feeding, and frequency of feeds. Plan to continue to follow 3 step feeding plan to include offering breast first, then supplementing with EBM/formula per feeding volume guidelines, then double pumping breasts - repeat all 3 steps every 3 hours or sooner for hunger cues. Plan to follow-up with LC at peds office for ongoing assistance with transition to exclusive breastfeeding. Parents deny questions/concerns.
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
## 2024-09-17 ENCOUNTER — TELEPHONE (OUTPATIENT)
Dept: UROLOGY | Facility: MEDICAL CENTER | Age: 34
End: 2024-09-17
Payer: COMMERCIAL

## 2024-11-06 ENCOUNTER — APPOINTMENT (OUTPATIENT)
Dept: UROLOGY | Facility: MEDICAL CENTER | Age: 34
End: 2024-11-06
Payer: COMMERCIAL

## 2024-11-06 DIAGNOSIS — B02.7 DISSEMINATED HERPES ZOSTER: ICD-10-CM

## 2024-11-06 LAB
APPEARANCE UR: NORMAL
BILIRUB UR STRIP-MCNC: NORMAL MG/DL
COLOR UR AUTO: NORMAL
GLUCOSE UR STRIP.AUTO-MCNC: NORMAL MG/DL
KETONES UR STRIP.AUTO-MCNC: NORMAL MG/DL
LEUKOCYTE ESTERASE UR QL STRIP.AUTO: NORMAL
NITRITE UR QL STRIP.AUTO: NORMAL
PH UR STRIP.AUTO: 5.5 [PH] (ref 5–8)
PROT UR QL STRIP: 30 MG/DL
RBC UR QL AUTO: NORMAL
SP GR UR STRIP.AUTO: 1.02
UROBILINOGEN UR STRIP-MCNC: 0.2 MG/DL

## 2024-11-06 PROCEDURE — 52000 CYSTOURETHROSCOPY: CPT | Performed by: STUDENT IN AN ORGANIZED HEALTH CARE EDUCATION/TRAINING PROGRAM

## 2024-11-06 PROCEDURE — 81002 URINALYSIS NONAUTO W/O SCOPE: CPT | Performed by: STUDENT IN AN ORGANIZED HEALTH CARE EDUCATION/TRAINING PROGRAM

## 2024-11-07 NOTE — PROGRESS NOTES
Shaji Cheatham is a 34 y.o. female who presents today for cystoscopy to evaluate Gross Hematuria.     No family history on file.    Social History     Socioeconomic History    Marital status:      Spouse name: Not on file    Number of children: Not on file    Years of education: Not on file    Highest education level: Not on file   Occupational History    Not on file   Tobacco Use    Smoking status: Never    Smokeless tobacco: Never   Vaping Use    Vaping status: Never Used   Substance and Sexual Activity    Alcohol use: No    Drug use: No    Sexual activity: Yes     Partners: Male   Other Topics Concern    Not on file   Social History Narrative    Not on file     Social Drivers of Health     Financial Resource Strain: Not on file   Food Insecurity: Not on file   Transportation Needs: Not on file   Physical Activity: Not on file   Stress: Not on file   Social Connections: Not on file   Intimate Partner Violence: Not on file   Housing Stability: Not on file       Past Surgical History:   Procedure Laterality Date    PRIMARY C SECTION N/A 2024    Procedure:  SECTION, PRIMARY;  Surgeon: Kalyn Linares M.D.;  [facility];  Service: Labor and Delivery       Past Medical History:   Diagnosis Date    Hypertension        Current Outpatient Medications   Medication Sig Dispense Refill    docusate sodium 100 MG Cap Take 1 capsule by mouth 2 times a day as needed for Constipation. 60 Capsule 1    simethicone (MYLICON) 125 MG chewable tablet Chew 1 Tablet 4 times a day as needed for Flatulence. 120 Tablet 1    ibuprofen (MOTRIN) 600 MG Tab Take 1 Tablet by mouth every 6 hours as needed for Moderate Pain. 30 Tablet 1    acetaminophen (TYLENOL) 500 MG Tab Take 1-2 Tablets by mouth every 6 hours as needed for Moderate Pain. 30 Tablet 1    Prenatal MV-Min-Fe Fum-FA-DHA (PRENATAL 1 PO) Take  by mouth.       No current facility-administered medications for  this visit.       No Known Allergies    Objective  There were no vitals taken for this visit.  Physical Exam  Constitutional:       Appearance: Normal appearance.   HENT:      Head: Normocephalic and atraumatic.   Pulmonary:      Effort: Pulmonary effort is normal.   Skin:     General: Skin is warm and dry.   Neurological:      General: No focal deficit present.      Mental Status: She is alert.   Psychiatric:         Mood and Affect: Mood normal.         Behavior: Behavior normal.         Labs:     POC UA  Lab Results   Component Value Date/Time    POCCOLOR dark yellow 11/06/2024 11:22 AM    POCAPPEAR cloudy 11/06/2024 11:22 AM    POCLEUKEST trace 11/06/2024 11:22 AM    POCNITRITE neg 11/06/2024 11:22 AM    POCUROBILIGE 0.2 11/06/2024 11:22 AM    POCPROTEIN 30 11/06/2024 11:22 AM    POCURPH 5.5 11/06/2024 11:22 AM    POCBLOOD large 11/06/2024 11:22 AM    POCSPGRV 1.025 11/06/2024 11:22 AM    POCKETONES neg 11/06/2024 11:22 AM    POCBILIRUBIN neg 11/06/2024 11:22 AM    POCGLUCUA neg 11/06/2024 11:22 AM      Imaging:     CT-ABDOMEN & PELVIS UROGRAM 09/14/2024    Narrative  9/14/2024 9:51 AM    HISTORY/REASON FOR EXAM: Hematuria.    TECHNIQUE/EXAM DESCRIPTION:  CT Urogram    Initial precontrast images were obtained from the diaphragmatic domes through the pubic symphysis using helical technique.    Following this, 100 mL of Omnipaque 350 nonionic contrast was administered, and postcontrast nephrographic phase thin-section helical scanning obtained from the diaphragmatic domes through the pubic symphysis.    Additional 10-minute delayed imaging is performed from the diaphragmatic domes through the pubic symphysis to evaluate the ureters. Coronal MIP reconstructions of the delayed phase are also performed.    Low dose optimization technique was utilized for this CT exam including automated exposure control and adjustment of the mA and/or kV according to patient size.    COMPARISON: None.    FINDINGS:  Lung bases: The  lung bases are clear.    Kidneys: No renal stones or hydronephrosis. No enhancing renal mass lesions.    Ureters: No ureteral stones. No ureteral mass lesions.    Bladder: No bladder stones or bladder mass lesions.    Liver: The liver is unremarkable.    Spleen: Spleen is unremarkable.    Biliary system: Gallbladder and biliary tree are unremarkable.    Pancreas: Pancreas is unremarkable.    Adrenals: Adrenal glands are unremarkable.    Vasculature: Renal veins are patent.    Lymph nodes: There are no enlarged lymph nodes.    Bowel: No bowel wall thickening or bowel obstruction.    Bones: Unremarkable.    Small volume pneumoperitoneum, postoperative. Postpartum uterus, status post recent  section. Expected air in the endometrial cavity.. Left basilar atelectasis and trace left pleural effusion.    Impression  1.  No suspicious renal, ureteral or bladder mass lesions.  2.  No renal, ureteral or bladder stones. No hydronephrosis.  3.  Normal postoperative appearance of postpartum uterus status post  section.  4.  Small amount of postoperative pneumoperitoneum.      Procedure    Procedure performed: Cystoscopy       Surgeon: Dr. Mahendra Wagner    Indications For Procedure: Gross Hematuria    Blood Loss: 0 cc     Anesthesia: Lidocaine jelly     Specimen: none     Findings:     1. Urethra: no lesions or masses    2. Bladder: no lesions or masses, no stones, no trabeculations    3. Bilateral ureteral jets observed with clear efflux bilaterally     Description of Procedure: The patient was prepped and draped in the usual sterile fashion.  A 17Fr flexible cystoscope was advanced along the urethra into the bladder under direct vision.  We performed a thorough cystoscopic evaluation patient's bladder including retroflexion, findings noted above.  We removed the cystoscope under direct vision to evaluate the urethra, findings noted above.  This concluded the procedure, the patient tolerated it well.      Assessment  The patient was instructed to call our office if she develops any signs of infection including increased frequency, urgency, dysuria, fever, or chills.  We discussed the results of the cystoscopy with the patient, all questions and concerns addressed. CT urogram was reviewed and discussed with the patient. All testing is negative. No further evaluation or treatment indicated. She may follow up as needed.    Plan    1. Disseminated herpes zoster  - POCT Urinalysis      RTC prn

## 2025-07-22 ENCOUNTER — APPOINTMENT (OUTPATIENT)
Dept: MEDICAL GROUP | Facility: MEDICAL CENTER | Age: 35
End: 2025-07-22
Payer: COMMERCIAL

## (undated) DEVICE — WATER IRRIGATION STERILE 1000ML (12EA/CA)

## (undated) DEVICE — SLEEVE, SEQUENTIAL CALF REG

## (undated) DEVICE — KIT SKIN NOSE AND MOUTH PRE-OP (20/CA)

## (undated) DEVICE — BLANKET UNDERBODY FULL ACCES - (5/CA)

## (undated) DEVICE — PLUMEPEN ULTRA 3/8 IN X 10 FT HOSE (20EA/CA)

## (undated) DEVICE — GLOVE BIOGEL SZ 7 SURGICAL PF LTX - (50PR/BX 4BX/CA)

## (undated) DEVICE — SOLUTION PLASMA-LYTE PH 7.4 INJ 1000ML (14EA/CA)

## (undated) DEVICE — CANNULA O2 COMFORT SOFT EAR ADULT 7 FT TUBING (50/CA)

## (undated) DEVICE — KIT  I.V. START (100EA/CA)

## (undated) DEVICE — SUTURE 0 VICRYL PLUS CT-1 - 36 INCH (36/BX)

## (undated) DEVICE — ELECTRODE DUAL RETURN W/ CORD - (50/PK)

## (undated) DEVICE — HEMOSTAT ABSORBABLE POWDER SURGICEL 3G (5EA/BX)

## (undated) DEVICE — SUTURE 0 36IN PDS + VIO CT-1 (36PK/BX)

## (undated) DEVICE — RETRACTOR O C SECTION LRY - (5/BX)

## (undated) DEVICE — SUTURE3-0 36IN VCRLY PLS ANTI (36PK/BX)

## (undated) DEVICE — CATHETER IV NON-SAFETY 18 GA X 1 1/4 (50/BX 4BX/CA)

## (undated) DEVICE — TUBING CLEARLINK DUO-VENT - C-FLO (48EA/CA)

## (undated) DEVICE — PACK C-SECTION (2EA/CA)

## (undated) DEVICE — TAPE CLOTH MEDIPORE 6 INCH - (12RL/CA)

## (undated) DEVICE — SUTURE 3-0 VICRYL PLUS CT-1 - 36 INCH (36/BX)

## (undated) DEVICE — STAPLER SKIN DISP - (6/BX 10BX/CA) VISISTAT

## (undated) DEVICE — HEAD HOLDER JUNIOR/ADULT

## (undated) DEVICE — Device

## (undated) DEVICE — TRAY SPINAL ANESTHESIA NON-SAFETY (10/CA)

## (undated) DEVICE — BLANKET UNDERBODY ADULT - (10/CA)

## (undated) DEVICE — DRESSING POST OP BORDER 4 X 10 (5EA/BX)

## (undated) DEVICE — FIBRILLAR SURGICEL 4X4 - 10/CA

## (undated) DEVICE — CHLORAPREP 26 ML APPLICATOR - ORANGE TINT(25/CA)

## (undated) DEVICE — BAG SPONGE COUNT 10.25 X 32 - BLUE (250/CA)

## (undated) DEVICE — SUTURE 2-0 CHROMIC GUT CT-1 27 (36PK/BX)"

## (undated) DEVICE — CLOTH PATIENT SKIN PREP 2% (192EA/CA)

## (undated) DEVICE — CANISTER SUCTION 3000ML MECHANICAL FILTER AUTO SHUTOFF MEDI-VAC NONSTERILE LF DISP (40EA/CA)

## (undated) DEVICE — CLOSURE SKIN STRIP 1/4 X 3 IN - (STERI STRIP) (50/BX)

## (undated) DEVICE — SET EXTENSION WITH 2 PORTS (48EA/CA) ***PART #2C8610 IS A SUBSTITUTE*****

## (undated) DEVICE — BLANKET STERILE CHICKIE FOR L&D (100/CA)

## (undated) DEVICE — GLOVE BIOGEL SZ 6.5 SURGICAL PF LTX (50PR/BX 4BX/CA)